# Patient Record
Sex: FEMALE | Race: WHITE | NOT HISPANIC OR LATINO | Employment: FULL TIME | URBAN - NONMETROPOLITAN AREA
[De-identification: names, ages, dates, MRNs, and addresses within clinical notes are randomized per-mention and may not be internally consistent; named-entity substitution may affect disease eponyms.]

---

## 2019-04-02 ENCOUNTER — APPOINTMENT (OUTPATIENT)
Dept: CT IMAGING | Facility: HOSPITAL | Age: 59
End: 2019-04-02

## 2019-04-02 ENCOUNTER — APPOINTMENT (OUTPATIENT)
Dept: GENERAL RADIOLOGY | Facility: HOSPITAL | Age: 59
End: 2019-04-02

## 2019-04-02 ENCOUNTER — HOSPITAL ENCOUNTER (EMERGENCY)
Facility: HOSPITAL | Age: 59
Discharge: ANOTHER HOSPITAL | End: 2019-04-02
Attending: EMERGENCY MEDICINE | Admitting: EMERGENCY MEDICINE

## 2019-04-02 VITALS
OXYGEN SATURATION: 100 % | BODY MASS INDEX: 28.34 KG/M2 | HEART RATE: 93 BPM | DIASTOLIC BLOOD PRESSURE: 92 MMHG | RESPIRATION RATE: 18 BRPM | WEIGHT: 187 LBS | SYSTOLIC BLOOD PRESSURE: 198 MMHG | HEIGHT: 68 IN | TEMPERATURE: 98.1 F

## 2019-04-02 DIAGNOSIS — G45.9 TIA (TRANSIENT ISCHEMIC ATTACK): Primary | ICD-10-CM

## 2019-04-02 DIAGNOSIS — I10 HYPERTENSION, UNSPECIFIED TYPE: ICD-10-CM

## 2019-04-02 LAB
ABO GROUP BLD: NORMAL
ALBUMIN SERPL-MCNC: 4.6 G/DL (ref 3.5–5.2)
ALBUMIN/GLOB SERPL: 1.5 G/DL
ALP SERPL-CCNC: 94 U/L (ref 39–117)
ALT SERPL W P-5'-P-CCNC: 12 U/L (ref 1–33)
ANION GAP SERPL CALCULATED.3IONS-SCNC: 10 MMOL/L
APTT PPP: 27 SECONDS (ref 20–40.3)
AST SERPL-CCNC: 13 U/L (ref 1–32)
BASOPHILS # BLD AUTO: 0.08 10*3/MM3 (ref 0–0.2)
BASOPHILS NFR BLD AUTO: 0.9 % (ref 0–1.5)
BILIRUB SERPL-MCNC: 0.5 MG/DL (ref 0.2–1.2)
BLD GP AB SCN SERPL QL: NEGATIVE
BUN BLD-MCNC: 11 MG/DL (ref 6–20)
BUN/CREAT SERPL: 15.9 (ref 7–25)
CALCIUM SPEC-SCNC: 9.9 MG/DL (ref 8.6–10.5)
CHLORIDE SERPL-SCNC: 104 MMOL/L (ref 98–107)
CO2 SERPL-SCNC: 27 MMOL/L (ref 22–29)
CREAT BLD-MCNC: 0.69 MG/DL (ref 0.57–1)
DEPRECATED RDW RBC AUTO: 40.6 FL (ref 37–54)
EOSINOPHIL # BLD AUTO: 0.32 10*3/MM3 (ref 0–0.4)
EOSINOPHIL NFR BLD AUTO: 3.6 % (ref 0.3–6.2)
ERYTHROCYTE [DISTWIDTH] IN BLOOD BY AUTOMATED COUNT: 12.5 % (ref 12.3–15.4)
GFR SERPL CREATININE-BSD FRML MDRD: 106 ML/MIN/1.73
GFR SERPL CREATININE-BSD FRML MDRD: 87 ML/MIN/1.73
GLOBULIN UR ELPH-MCNC: 3 GM/DL
GLUCOSE BLD-MCNC: 101 MG/DL (ref 65–99)
GLUCOSE BLDC GLUCOMTR-MCNC: 100 MG/DL (ref 70–130)
HCT VFR BLD AUTO: 43.5 % (ref 34–46.6)
HGB BLD-MCNC: 14.9 G/DL (ref 12–15.9)
HOLD SPECIMEN: NORMAL
HOLD SPECIMEN: NORMAL
IMM GRANULOCYTES # BLD AUTO: 0.03 10*3/MM3 (ref 0–0.05)
IMM GRANULOCYTES NFR BLD AUTO: 0.3 % (ref 0–0.5)
INR PPP: 0.83 (ref 0.8–1.2)
LYMPHOCYTES # BLD AUTO: 2.3 10*3/MM3 (ref 0.7–3.1)
LYMPHOCYTES NFR BLD AUTO: 25.7 % (ref 19.6–45.3)
Lab: NORMAL
MCH RBC QN AUTO: 30.1 PG (ref 26.6–33)
MCHC RBC AUTO-ENTMCNC: 34.3 G/DL (ref 31.5–35.7)
MCV RBC AUTO: 87.9 FL (ref 79–97)
MONOCYTES # BLD AUTO: 0.51 10*3/MM3 (ref 0.1–0.9)
MONOCYTES NFR BLD AUTO: 5.7 % (ref 5–12)
NEUTROPHILS # BLD AUTO: 5.71 10*3/MM3 (ref 1.4–7)
NEUTROPHILS NFR BLD AUTO: 63.8 % (ref 42.7–76)
NRBC BLD AUTO-RTO: 0 /100 WBC (ref 0–0)
PLATELET # BLD AUTO: 342 10*3/MM3 (ref 140–450)
PMV BLD AUTO: 10.2 FL (ref 6–12)
POTASSIUM BLD-SCNC: 3.9 MMOL/L (ref 3.5–5.2)
PROT SERPL-MCNC: 7.6 G/DL (ref 6–8.5)
PROTHROMBIN TIME: 11.3 SECONDS (ref 11.1–15.3)
RBC # BLD AUTO: 4.95 10*6/MM3 (ref 3.77–5.28)
RH BLD: NEGATIVE
SODIUM BLD-SCNC: 141 MMOL/L (ref 136–145)
T&S EXPIRATION DATE: NORMAL
TROPONIN T SERPL-MCNC: <0.01 NG/ML (ref 0–0.03)
WBC NRBC COR # BLD: 8.95 10*3/MM3 (ref 3.4–10.8)
WHOLE BLOOD HOLD SPECIMEN: NORMAL
WHOLE BLOOD HOLD SPECIMEN: NORMAL

## 2019-04-02 PROCEDURE — 84484 ASSAY OF TROPONIN QUANT: CPT | Performed by: EMERGENCY MEDICINE

## 2019-04-02 PROCEDURE — 70450 CT HEAD/BRAIN W/O DYE: CPT

## 2019-04-02 PROCEDURE — 85025 COMPLETE CBC W/AUTO DIFF WBC: CPT | Performed by: EMERGENCY MEDICINE

## 2019-04-02 PROCEDURE — 71045 X-RAY EXAM CHEST 1 VIEW: CPT

## 2019-04-02 PROCEDURE — 86900 BLOOD TYPING SEROLOGIC ABO: CPT | Performed by: EMERGENCY MEDICINE

## 2019-04-02 PROCEDURE — 99285 EMERGENCY DEPT VISIT HI MDM: CPT

## 2019-04-02 PROCEDURE — 96374 THER/PROPH/DIAG INJ IV PUSH: CPT

## 2019-04-02 PROCEDURE — 86901 BLOOD TYPING SEROLOGIC RH(D): CPT | Performed by: EMERGENCY MEDICINE

## 2019-04-02 PROCEDURE — 82962 GLUCOSE BLOOD TEST: CPT

## 2019-04-02 PROCEDURE — 85730 THROMBOPLASTIN TIME PARTIAL: CPT | Performed by: EMERGENCY MEDICINE

## 2019-04-02 PROCEDURE — 93005 ELECTROCARDIOGRAM TRACING: CPT | Performed by: EMERGENCY MEDICINE

## 2019-04-02 PROCEDURE — 86850 RBC ANTIBODY SCREEN: CPT | Performed by: EMERGENCY MEDICINE

## 2019-04-02 PROCEDURE — 80053 COMPREHEN METABOLIC PANEL: CPT | Performed by: EMERGENCY MEDICINE

## 2019-04-02 PROCEDURE — 93010 ELECTROCARDIOGRAM REPORT: CPT | Performed by: INTERNAL MEDICINE

## 2019-04-02 PROCEDURE — 85610 PROTHROMBIN TIME: CPT | Performed by: EMERGENCY MEDICINE

## 2019-04-02 PROCEDURE — 25010000002 HYDRALAZINE PER 20 MG: Performed by: EMERGENCY MEDICINE

## 2019-04-02 RX ORDER — SODIUM CHLORIDE 0.9 % (FLUSH) 0.9 %
10 SYRINGE (ML) INJECTION AS NEEDED
Status: DISCONTINUED | OUTPATIENT
Start: 2019-04-02 | End: 2019-04-03 | Stop reason: HOSPADM

## 2019-04-02 RX ORDER — HYDRALAZINE HYDROCHLORIDE 20 MG/ML
10 INJECTION INTRAMUSCULAR; INTRAVENOUS ONCE
Status: COMPLETED | OUTPATIENT
Start: 2019-04-02 | End: 2019-04-02

## 2019-04-02 RX ADMIN — HYDRALAZINE HYDROCHLORIDE 10 MG: 20 INJECTION INTRAMUSCULAR; INTRAVENOUS at 21:29

## 2019-04-03 NOTE — ED PROVIDER NOTES
Subjective   58-year-old white female presents to the emergency department with chief complaint of stroke symptoms.  Patient relates at 7 PM she developed slurred speech, left facial numbness, left facial drooping, abnormal speech and weakness in both upper extremities greater on the left than the right.  The symptoms lasted 2 minutes and resolved.  The same symptoms recurred at 8:45 PM and have resolved again.  Patient relates she now feels back to her baseline.  Patient relates she took 162 mg aspirin prior to arrival.  She denies taking any other blood thinners.            Review of Systems   Constitutional: Negative for chills, diaphoresis and fever.   Eyes: Negative for visual disturbance.   Respiratory: Negative for shortness of breath.    Cardiovascular: Negative for chest pain.   Gastrointestinal: Negative for abdominal pain, nausea and vomiting.   Musculoskeletal: Negative for neck stiffness.   Neurological: Positive for facial asymmetry, speech difficulty, weakness, numbness and headaches. Negative for syncope.   Psychiatric/Behavioral: Negative for confusion.   All other systems reviewed and are negative.      Past Medical History:   Diagnosis Date   • Hypertension        Allergies   Allergen Reactions   • Penicillins Hives       History reviewed. No pertinent surgical history.    History reviewed. No pertinent family history.    Social History     Tobacco Use   • Smoking status: Current Every Day Smoker     Packs/day: 0.50     Years: 40.00     Pack years: 20.00   Substance and Sexual Activity   • Alcohol use: No     Frequency: Never   • Drug use: No           Objective   Physical Exam   Constitutional: She appears well-developed and well-nourished. No distress.   HENT:   Head: Normocephalic and atraumatic.   Right Ear: External ear normal.   Left Ear: External ear normal.   Nose: Nose normal.   Mouth/Throat: Oropharynx is clear and moist.   Eyes: Conjunctivae and EOM are normal. Pupils are equal, round,  and reactive to light.   Neck: Normal range of motion. Neck supple.   Cardiovascular: Normal rate, regular rhythm, normal heart sounds and intact distal pulses.   Pulmonary/Chest: Effort normal and breath sounds normal.   Abdominal: Soft. Bowel sounds are normal. She exhibits no distension. There is no tenderness. There is no guarding.   Musculoskeletal: Normal range of motion. She exhibits no edema or tenderness.   Neurological:   Patient is alert and oriented x3.  Her speech is normal.  Cranial nerves II through XII are intact bilaterally.  Visual fields are intact bilaterally.  She has normal strength and no drift in all extremities.  Sharp dull sensation is intact and symmetrical in all extremities.  Finger to nose and heel to shin movements are done normally bilaterally.   Skin: Skin is warm and dry. She is not diaphoretic.   Psychiatric: She has a normal mood and affect. Her behavior is normal.   Nursing note and vitals reviewed.      ECG 12 Lead    Date/Time: 4/2/2019 9:06 PM  Performed by: Anderson Mcgee MD  Authorized by: Anderson Mcgee MD   Interpreted by physician  Rhythm: sinus rhythm  Rate: normal  BPM: 81  QRS axis: normal  Conduction: conduction normal  ST Segments: ST segments normal  T Waves: T waves normal  Clinical impression: non-specific ECG      Critical Care  Performed by: Anderson Mcgee MD  Authorized by: Anderson Mcgee MD     Critical care provider statement:     Critical care time (minutes):  30    Critical care time was exclusive of:  Separately billable procedures and treating other patients    Critical care was necessary to treat or prevent imminent or life-threatening deterioration of the following conditions:  CNS failure or compromise    Critical care was time spent personally by me on the following activities:  Development of treatment plan with patient or surrogate, discussions with consultants, evaluation of patient's response to treatment, examination of patient, obtaining history  from patient or surrogate, ordering and performing treatments and interventions, ordering and review of laboratory studies, ordering and review of radiographic studies, re-evaluation of patient's condition and review of old charts    I assumed direction of critical care for this patient from another provider in my specialty: no                 ED Course  ED Course as of Apr 02 2148   Tue Apr 02, 2019 2128 Patient is not a candidate for thrombolytic therapy since her symptoms have resolved and her NIHSS is 0.  [facility].  I explained to the patient the need for transfer for higher level of care.  Helicopter has been requested.  [DR]   2146 Dr Cotton accepts transfer to [facility]  [DR]      ED Course User Index  [DR] Anderson Mcgee MD      Labs Reviewed   COMPREHENSIVE METABOLIC PANEL - Abnormal; Notable for the following components:       Result Value    Glucose 101 (*)     All other components within normal limits    Narrative:     GFR Normal >60  Chronic Kidney Disease <60  Kidney Failure <15   PROTIME-INR - Normal    Narrative:     Therapeutic range for most indications is 2.0-3.0 INR,  or 2.5-3.5 for mechanical heart valves.   APTT - Normal    Narrative:     The recommended Heparin therapeutic range is 68-97 seconds.   TROPONIN (IN-HOUSE) - Normal    Narrative:     Troponin T Reference Range:  <= 0.03 ng/mL-   Negative for AMI  >0.03 ng/mL-     Abnormal for myocardial necrosis.  Clinicians would have to utilize clinical acumen, EKG, Troponin and serial changes to determine if it is an Acute Myocardial Infarction or myocardial injury due to an underlying chronic condition.    CBC WITH AUTO DIFFERENTIAL - Normal   POCT GLUCOSE FINGERSTICK - Normal   RAINBOW DRAW    Narrative:     The following orders were created for panel order [facility] Draw.  Procedure                               Abnormality         Status                     ---------                                -----------         ------                     Light Blue Top[[id number]]                                   In process                 Green Top (Gel)[[id number]]                                  In process                 Lavender Top[[id number]]                                     In process                 Gold Top - SST[[id number]]                                   In process                   Please view results for these tests on the individual orders.   POCT GLUCOSE FINGERSTICK   PREVIOUS HISTORY   TYPE AND SCREEN   CBC AND DIFFERENTIAL    Narrative:     The following orders were created for panel order CBC & Differential.  Procedure                               Abnormality         Status                     ---------                               -----------         ------                     CBC Auto Differential[[id number]]        Normal              Final result                 Please view results for these tests on the individual orders.   LIGHT BLUE TOP   GREEN TOP   LAVENDER TOP   GOLD TOP - SST     Xr Chest 1 View    Result Date: 4/2/2019  Narrative: PROCEDURE: XR CHEST 1 VIEW HISTORY: Acute Stroke Protocol (Onset < 12 hrs) COMPARISON: None TECHNIQUE: Single projection of the chest was done. FINDINGS: The lung fields are well inflated . There are no discrete airspace infiltrates, pneumothoraces or pleural effusions. The pulmonary vascularity is normal. The cardiomediastinal silhouette is unremarkable for patient's age and sex.     Impression: There is no acute pleural-parenchymal process seen in the imaged lung fields. Electronically signed by:  Jonny Alonzo MD  4/2/2019 9:16 PM CDT Workstation: [email or web address]    Ct Head Without Contrast Stroke Protocol    Result Date: 4/2/2019  Narrative: PROCEDURE: CT HEAD WO CONTRAST STROKE HISTORY: Stroke Indication: Same as above Comparison: None Technique: CT of the head was done without intravenous contrast was done in the orthogonal planes. This exam was  performed according to our departmental dose-optimization program, which includes automated exposure control, adjustment of the mA and/or KV according to the patient's size and/or use of iterative reconstruction technique. FINDINGS: There is no intracranial hemorrhage, midline shift mass effect or acute focal infarct. There is mild periventricular and deep white matter low attenuation, most likely related to small vessel white matter ischemic disease. There is no hydrocephalus. The mastoid air cells are unremarkable . The paranasal sinuses are unremarkable . There is no visualization of acute fractures involving the calvarium or the skull base.     Impression: There is no acute intracranial abnormality. Mild chronic small vessel white matter ischemic changes seen in the bilateral cerebral hemispheres, predominantly in a periventricular distribution If clinical concern exists regarding an acute ischemic/vascular pathology being responsible for patient's symptomatology, an MRI of the brain is more sensitive than the current study, in ruling out such a possibility. Electronically signed by:  Jonny Alonzo MD  4/2/2019 9:03 PM CDT Workstation: Dr. [email or web address]            NIHSS (NIH Stroke Scale/Score) reviewed and/or performed as part of the patient evaluation and treatment planning process.  The result associated with this review/performance is: 0       MDM      Final diagnoses:   TIA (transient ischemic attack)   Hypertension, unspecified type            Anderson Mcgee MD  04/02/19 5893       Anderson Mcgee MD  04/02/19 4469

## 2019-04-03 NOTE — ED TRIAGE NOTES
"Patient states started at 1900 with \"shakey, slurred speeched\" she states this resolved and felt weak afterwards.   and started again prior to arrival with same symptoms    She complains of facial droopiness, slurred speech, numbness and tingling to left side of face. States weakness on both upper extremities with left sided notes more weakness.   "

## 2019-04-15 ENCOUNTER — TRANSCRIBE ORDERS (OUTPATIENT)
Dept: OCCUPATIONAL THERAPY | Facility: HOSPITAL | Age: 59
End: 2019-04-15

## 2019-04-15 ENCOUNTER — TRANSCRIBE ORDERS (OUTPATIENT)
Dept: PHYSICAL THERAPY | Facility: HOSPITAL | Age: 59
End: 2019-04-15

## 2019-04-15 DIAGNOSIS — I63.9 ACUTE CEREBRAL INFARCTION (HCC): Primary | ICD-10-CM

## 2019-04-15 DIAGNOSIS — I63.9 ACUTE CEREBROVASCULAR ACCIDENT (HCC): Primary | ICD-10-CM

## 2019-04-17 ENCOUNTER — HOSPITAL ENCOUNTER (OUTPATIENT)
Dept: OCCUPATIONAL THERAPY | Facility: HOSPITAL | Age: 59
Setting detail: THERAPIES SERIES
Discharge: HOME | End: 2019-04-17

## 2019-04-17 ENCOUNTER — HOSPITAL ENCOUNTER (OUTPATIENT)
Dept: PHYSICAL THERAPY | Facility: HOSPITAL | Age: 59
Setting detail: THERAPIES SERIES
Discharge: HOME | End: 2019-04-17

## 2019-04-17 DIAGNOSIS — I63.9 ACUTE CEREBROVASCULAR ACCIDENT (HCC): Primary | ICD-10-CM

## 2019-04-17 DIAGNOSIS — I63.9 ACUTE CVA (CEREBROVASCULAR ACCIDENT) (HCC): Primary | ICD-10-CM

## 2019-04-17 PROCEDURE — 97161 PT EVAL LOW COMPLEX 20 MIN: CPT

## 2019-04-17 PROCEDURE — 97165 OT EVAL LOW COMPLEX 30 MIN: CPT

## 2019-04-17 NOTE — THERAPY EVALUATION
"    Outpatient Physical Therapy Ortho Initial Evaluation/Discharge Summary  [facility]     Patient Name: Yesenia Valentin  : 1960  MRN: [id number]  Today's Date: 2019      Visit Date: 2019    There is no problem list on file for this patient.       Past Medical History:   Diagnosis Date   • Hypertension         No past surgical history on file.    Visit Dx:     ICD-10-CM ICD-9-CM   1. Acute cerebrovascular accident (CMS/[facility]) I63.9 434.91         Patient History     Row Name 19 1344 19 1300          History    Chief Complaint  Fatigue/poor endurance;Headache;Muscle weakness;Pain;Other 1 (comment) Increased sensitivity to light  -AB  --     Date Current Problem(s) Began  19  -AB  19  -     Brief Description of Current Complaint  Pt reported her mouth began drawing up on L side of face. She did not go to hospital for 1 hour and 45 min. She went to  ER and was lifeflighted to [facility] and symptoms resolved by 11 PM. Symptoms returned at 3 AM the following morning. Pt reports deficits with speech, R hand and RUE, she has difficulty writing and typing, she reports ROM has resolved. Pt states she is a  and needs to be able to type for her job. Pt reports she returned to work last Monday and although her symptoms have improved, she continues to have difficulties performing at her job.  -AB  Was at work, felt \"funny\" mouth started drawing up. Went to ER was transferred to [facility]. Symptoms resolved after transfer by 11 PM. Symptoms did return early next morning. Patient reports deficits with R UE and speech, especially use of her R hand for typing and writing.      -     Previous treatment for THIS PROBLEM  Other (comment) OT at hospital  -AB  --     Patient/Caregiver Goals  Return to work;Relieve pain;Return to prior level of function Return to work: improve typing skills  -AB  Return to prior level of function  -     Current Tobacco Use  No  -AB "  --     Smoking Status   No  -AB  Quit smoking 2 weeks ago  -     Patient's Rating of General Health  Good  -AB  --     Hand Dominance  right-handed  -AB  right-handed  -     Occupation/sports/leisure activities  , fishing, boating, reading  -AB    -     Patient seeing anyone else for problem(s)?  Referred for speech  -AB  --     What clinical tests have you had for this problem?  --  CT scan;MRI  -     Results of Clinical Tests  --  Probable TIA  -     Related/Recent Hospitalizations  --  Yes  -     Date of Hospitalization  --  04/02/19  -        Pain     Pain Location  Head  -AB  -- Tension in R side of neck  -     Pain at Present  4 Headache  -AB  --     Pain at Best  0  -AB  --     Pain at Worst  7  -AB  --     Pain Frequency  Other (Comment);Constant/continuous Pt reports headache medicine helps symptoms  -AB  --     Pain Description  Aching;Discomfort;Pressure;Tightness  -AB  --     What Performance Factors Make the Current Problem(s) WORSE?  Stress  -AB  --     What Performance Factors Make the Current Problem(s) BETTER?  Headache medication: Tyelonol   -AB  --     Pain Comments  Pain noted in posterior head towards R side.  -AB  --     Is your sleep disturbed?  Yes  -AB  --     Total hours of sleep per night  4 Pt awakens and is up for an hour before going back to sleep  -AB  --     Difficulties at work?  Yes: typing, writing, using a calculator  -AB  --     Difficulties with ADL's?  No  -AB  --     Difficulties with recreational activities?  No  -AB  --        Safety    Are you being hurt, hit, or frightened by anyone at home or in your life?  No  -AB  --     Are you being neglected by a caregiver  No  -AB  --       User Key  (r) = Recorded By, (t) = Taken By, (c) = Cosigned By    Initials Name Provider Type    AB Pawel Francisco, OT Occupational Therapist    DH Radha Leone, PT Physical Therapist          PT Ortho     Row Name 04/17/19 1300       Subjective  Comments    Subjective Comments  Patient reports she does not feel her leg is a problem  -       Posture/Observations    Posture- WNL  Posture is WNL  -       General ROM    GENERAL ROM COMMENTS  WNL  -       MMT (Manual Muscle Testing)    General MMT Comments  R LE MMT grossly 4+/5  -       Balance Skills Training    Standing-Balance Activities  Single Limb Stance Able to maintain SLS for > 30 seconds without LOB.  -       Gait/Stairs Assessment/Training    Comment (Gait/Stairs)  No deficits noted.   -      User Key  (r) = Recorded By, (t) = Taken By, (c) = Cosigned By    Initials Name Provider Type     Radha Leone, PT Physical Therapist           Hand Therapy (last 24 hours)      Hand Eval     Row Name 04/17/19 1400             Hand  Strength     Strength Affected Side  Bilateral  -AB          Strength Right    # Reps  3  -AB      Right Rung  2  -AB      Right  Test 1  62  -AB      Right  Test 2  63  -AB      Right  Test 3  61  -AB       Strength Average Right  62  -AB          Strength Left    # Reps  3  -AB      Left Rung  2  -AB      Left  Test 1  68  -AB      Left  Test 2  74  -AB      Left  Test 3  63  -AB       Strength Average Left  68.33  -AB        User Key  (r) = Recorded By, (t) = Taken By, (c) = Cosigned By    Initials Name Provider Type    AB Pawel Francisco, OT Occupational Therapist                              PT Assessment/Plan     Row Name 04/17/19 1300          PT Assessment    Assessment Comments  Patient does not appear to have strength, mobility or balance deficits of RLE. Physical therapy not indicated at this time. Patient advised to contact her physician or this therapist if her condition should worsen. Discharge from physical therapy.   -        PT Plan    Planned CPT's?  PT ROBERT LOW COMPLEXITY: 55295  -     PT Plan Comments  Plan to discharge. Patient advised to contact her physician or this therapist if her symptoms  or condition should worsen.  -       User Key  (r) = Recorded By, (t) = Taken By, (c) = Cosigned By    Initials Name Provider Type    Radha Dejesus PT Physical Therapist            Exercises     Row Name 04/17/19 1300             Subjective Comments    Subjective Comments  Patient reports she does not feel her leg is a problem  -        User Key  (r) = Recorded By, (t) = Taken By, (c) = Cosigned By    Initials Name Provider Type    Radha Dejesus, PT Physical Therapist                                  Time Calculation:     Start Time: 1300  Stop Time: 1325  Time Calculation (min): 25 min     Therapy Charges for Today     Code Description Service Date Service Provider Modifiers Qty    [id number] HC PT EVAL LOW COMPLEXITY 2 4/17/2019 Radha Leone, PT GP 2                    Radha Leone, RADHA  4/17/2019

## 2019-04-17 NOTE — THERAPY DISCHARGE NOTE
"Outpatient Occupational Therapy Rehab Program Initial Evaluation/Discharge  [facility]     Patient Name: Yesenia Valentin  : 1960  MRN: [id number]  Today's Date: 2019      Visit Date: 2019   Visit Number: 1  Recert Date: N/A  % Improvement: N/A  MD visit date: N/A        There is no problem list on file for this patient.       Past Medical History:   Diagnosis Date   • Hypertension         No past surgical history on file.      Visit Dx:    ICD-10-CM ICD-9-CM   1. Acute CVA (cerebrovascular accident) (CMS/[facility]) I63.9 434.91       Patient History     Row Name 19 1344 19 1300          History    Chief Complaint  Fatigue/poor endurance;Headache;Muscle weakness;Pain;Other 1 (comment) Increased sensitivity to light  -AB  --     Date Current Problem(s) Began  19  -AB  19  -     Brief Description of Current Complaint  Pt reported her mouth began drawing up on L side of face. She did not go to hospital for 1 hour and 45 min. She went to  ER and was lifeflighted to [facility] and symptoms resolved by 11 PM. Symptoms returned at 3 AM the following morning. Pt reports deficits with speech, R hand and RUE, she has difficulty writing and typing, she reports ROM has resolved. Pt states she is a  and needs to be able to type for her job. Pt reports she returned to work last Monday and although her symptoms have improved, she continues to have difficulties performing at her job.  -AB  Was at work, felt \"funny\" mouth started drawing up. Went to ER was transferred to [facility]. Symptoms resolved after transfer by 11 PM. Symptoms did return early next morning. Patient reports deficits with R UE and speech, especially use of her R hand for typing and writing.    DH  -     Previous treatment for THIS PROBLEM  Other (comment) OT at hospital  -AB  --     Patient/Caregiver Goals  Return to work;Relieve pain;Return to prior level of function Return to work: improve typing " skills  -AB  Return to prior level of function  -     Current Tobacco Use  No  -AB  --     Smoking Status   No  -AB  Quit smoking 2 weeks ago  -     Patient's Rating of General Health  Good  -AB  --     Hand Dominance  right-handed  -AB  right-handed  -     Occupation/sports/leisure activities  , fishing, boating, reading  -AB    -     Patient seeing anyone else for problem(s)?  Referred for speech  -AB  --     What clinical tests have you had for this problem?  --  CT scan;MRI  -     Results of Clinical Tests  --  Probable TIA  -     Related/Recent Hospitalizations  --  Yes  -     Date of Hospitalization  --  04/02/19  -        Pain     Pain Location  Head  -AB  -- Tension in R side of neck  -     Pain at Present  4 Headache  -AB  --     Pain at Best  0  -AB  --     Pain at Worst  7  -AB  --     Pain Frequency  Other (Comment);Constant/continuous Pt reports headache medicine helps symptoms  -AB  --     Pain Description  Aching;Discomfort;Pressure;Tightness  -AB  --     What Performance Factors Make the Current Problem(s) WORSE?  Stress  -AB  --     What Performance Factors Make the Current Problem(s) BETTER?  Headache medication: Tyelonol   -AB  --     Pain Comments  Pain noted in posterior head towards R side.  -AB  --     Is your sleep disturbed?  Yes  -AB  --     Total hours of sleep per night  4 Pt awakens and is up for an hour before going back to sleep  -AB  --     Difficulties at work?  Yes: typing, writing, using a calculator  -AB  --     Difficulties with ADL's?  No  -AB  --     Difficulties with recreational activities?  No  -AB  --        Safety    Are you being hurt, hit, or frightened by anyone at home or in your life?  No  -AB  --     Are you being neglected by a caregiver  No  -AB  --       User Key  (r) = Recorded By, (t) = Taken By, (c) = Cosigned By    Initials Name Provider Type    AB Pawel Francisco, OT Occupational Therapist     Radha Leone,  PT Physical Therapist            OT Neuro     Row Name 04/17/19 1400             Home Living    Living Arrangements  house  -AB      Living Environment Comment  Pt reports no difficulties accessing anything in her home. She lives in a house alone.  -AB         Vision- Basic    Current Vision  Other (Comment) Wears glasses post TIA d/t R eye  -AB         Cognitive Assessment/Intervention    Current Cognitive/Communication Assessment  functional  -AB      Orientation Status (Cognition)  situation;time;place;person;oriented x 4  -AB         Sensation    Sensation WNL?  WFL  -AB      Additional Comments  Pt reports no deficits with sensation. OT lightly touched B fingtertips and pt required increased processing time to identify where touch was located in R hand.  -AB         Coordination    Diadochokinesis  Bilteral:;No  -AB      Coordination Tests  Finger to nose eyes closed;9-Hole Peg  -AB      Finger to Nose Eyes Closed  Bilteral:;Intact  -AB      9-Hole Peg Left  20.77 sec  -AB      9-Hole Peg Right  29.5 sec  -AB         General ROM    GENERAL ROM COMMENTS  BUE WNL  -AB         MMT (Manual Muscle Testing)    Rt Upper Ext  Rt Shoulder Flexion;Rt Shoulder Extension;Rt Shoulder Internal Rotation;Rt Shoulder External Rotation;Rt Elbow Flexion;Rt Elbow Extension;Rt Forearm Supination;Rt Forearm Pronation;Rt Wrist Flexion;Rt Wrist Extension  -AB      Lt Upper Ext  Lt Shoulder Flexion;Lt Shoulder Extension;Lt Shoulder Internal Rotation;Lt Shoulder External Rotation;Lt Elbow Flexion;Lt Elbow Extension;Lt Forearm Supination;Lt Forearm Pronation;Lt Wrist Flexion;Lt Wrist Extension  -AB         MMT Right Upper Ext    Rt Shoulder Flexion MMT, Gross Movement  (5/5) normal  -AB      Rt Shoulder Extension MMT, Gross Movement  (5/5) normal  -AB      Rt Shoulder Internal Rotation MMT, Gross Movement  (5/5) normal  -AB      Rt Shoulder External Rotation MMT, Gross Movement  (5/5) normal  -AB      Rt Elbow Flexion MMT, Gross  Movement:  (5/5) normal  -AB      Rt Elbow Extension MMT, Gross Movement:  (5/5) normal  -AB      Rt Forearm Supination MMT, Gross Movement  (5/5) normal  -AB      Rt Forearm Pronation MMT, Gross Movement  (5/5) normal  -AB      Rt Wrist Flexion MMT, Gross Movement  (5/5) normal  -AB      Rt Wrist Extension MMT, Gross Movement  (5/5) normal  -AB         MMT Left Upper Ext    Lt Shoulder Flexion MMT, Gross Movement  (5/5) normal  -AB      Lt Shoulder Extension MMT, Gross Movement  (5/5) normal  -AB      Lt Shoulder Internal Rotation MMT, Gross Movement  (5/5) normal  -AB      Lt Shoulder External Rotation MMT, Gross Movement  (5/5) normal  -AB      Lt Elbow Flexion MMT, Gross Movement  (5/5) normal  -AB      Lt Elbow Extension MMT, Gross Movement  (5/5) normal  -AB      Lt Forearm Supination MMT, Gross Movement  (5/5) normal  -AB      Lt Forearm Pronation MMT, Gross Movement  (5/5) normal  -AB      Lt Wrist Flexion MMT, Gross Movement  (5/5) normal  -AB      Lt Wrist Extension MMT, Gross Movement  (5/5) normal  -AB         Bed Mobility    Bed Mobility, Comment  Independent  -AB         Transfers    Transfer, Comment  Independent  -AB         Functional Mobility    Functional Mobility- Comment  Ambulates independently  -AB         ADL Assessment/Intervention    ADL's Assessed?  Upper Body Bathing;Lower Body Bathing;Upper Body Dressing;Lower Body Dressing;Toileting;Grooming  -AB         Bathing Assessment/Intervention    Comment (Bathing)  Independent  -AB         Upper Body Dressing Assessment/Training    Comment (Upper Body Dressing)  Independent  -AB         Lower Body Dressing Assessment/Training    Comment (Lower Body Dressing)  Independent  -AB         Toileting Assessment/Training    Comment (Toileting)  Independent  -AB         Grooming Assessment/Training    Comment (Grooming)  Independent  -AB        User Key  (r) = Recorded By, (t) = Taken By, (c) = Cosigned By    Initials Name Provider Type    AB Maryam  Pawel, OT Occupational Therapist           Hand Therapy (last 24 hours)      Hand Eval     Row Name 04/17/19 1400             Hand  Strength     Strength Affected Side  Bilateral  -AB          Strength Right    # Reps  3  -AB      Right Rung  2  -AB      Right  Test 1  62  -AB      Right  Test 2  63  -AB      Right  Test 3  61  -AB       Strength Average Right  62  -AB          Strength Left    # Reps  3  -AB      Left Rung  2  -AB      Left  Test 1  68  -AB      Left  Test 2  74  -AB      Left  Test 3  63  -AB       Strength Average Left  68.33  -AB        User Key  (r) = Recorded By, (t) = Taken By, (c) = Cosigned By    Initials Name Provider Type    AB Pawel Francisco, OT Occupational Therapist                Therapy Education     Row Name 04/17/19 1400             Therapy Education    Education Details  HEP: pt was provided with medium soft theraputty and instructed on FMC/pinch and  strengthening exercises with putty and clothespin.  -AB      Given  HEP  -AB      Program  New  -AB      How Provided  Verbal;Demonstration  -AB      Provided to  Patient  -AB      Level of Understanding  Teach back education performed;Verbalized;Demonstrated  -AB        User Key  (r) = Recorded By, (t) = Taken By, (c) = Cosigned By    Initials Name Provider Type    AB Pawel Francisco, OT Occupational Therapist              OT Assessment/Plan     Row Name 04/17/19 1400          OT Assessment    Functional Limitations  Performance in work activities  -AB     Impairments  Dexterity;Pain;Other (comment) Impaired motor processing time  -AB     Assessment Comments  OT evaluation completed this date. Pt is a 57 y/o female presenting this date with adverse reactions associated with a TIA, impacting R side. Pt c/o headaches in R posterior side of head. She presents with decreased  strength and FMC in R hand when compared to L hand. Increased motor processing time also noted this  date.  AROM and BUE strength WNL. Pt reports diffculties completing work tasks such as typing and writing. She was educated on exercises to complete with theraputty and clothespins to improve isolated finger dexterity, as well as  and pinch strengh in R hand. No deficits reported when completing ADLs. D/t high functioning level, pt not recommended for skilled OT services at this time. Pt would possibly benefit from speech therapy.  -AB     OT Diagnosis  Impaired mobility and ADLs  -AB     OT Rehab Potential  Other (comment) Pt is appropriate for speech at this time.  -AB     Patient/caregiver participated in establishment of treatment plan and goals  Yes  -AB     Patient would benefit from skilled therapy intervention  No Pt would possibly benefit from speech at this time.  -AB        OT Plan    OT Frequency  One time visit  -AB     Planned CPT's?  OT EVAL LOW COMPLEXITY: 12939  -AB     OT Plan Comments  OT evaluation only this date. Provided pt with medium soft theraputty at this time. Educated pt to reach out to OT if there are any questions or concerns or functional decline noted.  -AB       User Key  (r) = Recorded By, (t) = Taken By, (c) = Cosigned By    Initials Name Provider Type    AB Pawel Francisco, OT Occupational Therapist                  Outcome Measures     Row Name 04/17/19 1400             Quick DASH    Open a tight or new jar.  2  -AB      Do heavy household chores (e.g., wash walls, wash floors)  1  -AB      Carry a shopping bag or briefcase  1  -AB      Wash your back  1  -AB      Use a knife to cut food  2  -AB      Recreational activities in which you take some force or impact through your arm, should or hand (e.g. golf, hammering, tennis, etc.)  1  -AB      During the past week, to what extent has your arm, shoulder, or hand problem interfered with your normal social activites with family, friends, neighbors or groups?  1  -AB      During the past week, were you limited in your work or  other regular daily activities as a result of your arm, shoulder or hand problem?  4  -AB      Arm, Shoulder, or hand pain  2  -AB      Tingling (pins and needles) in your arm, shoulder, or hand  1  -AB      During the past week, how much difficulty have you had sleeping because of the pain in your arm, shoulder or hand?  1  -AB      Number of Questions Answered  11  -AB      Quick DASH Score  13.64  -AB         Functional Assessment    Outcome Measure Options  Quick DASH  -AB        User Key  (r) = Recorded By, (t) = Taken By, (c) = Cosigned By    Initials Name Provider Type    Pawel De Dios OT Occupational Therapist            Time Calculation:   OT Start Time: 1344  OT Stop Time: 1430  OT Time Calculation (min): 46 min     Therapy Charges for Today     Code Description Service Date Service Provider Modifiers Qty    18888209921  OT EVAL LOW COMPLEXITY 3 4/17/2019 Pawel Francisco OT GO 1                 OP OT Discharge Summary  Date of Discharge: 04/17/19  Reason for Discharge: other (comment)(Pt independent with ADLs/IADLs at this time)  Discharge Destination: Other (comment)(Home with HEP)  Discharge Instructions: OT evaluation only. Pt provided with theraputty HEP and demonstrated understanding of HEP. Pt educated to contact OT with any questions or if functional decline is noted.        Pawel Francisco OT  4/17/2019

## 2019-04-18 ENCOUNTER — TRANSCRIBE ORDERS (OUTPATIENT)
Dept: SPEECH THERAPY | Facility: HOSPITAL | Age: 59
End: 2019-04-18

## 2019-04-18 DIAGNOSIS — I63.9: Primary | ICD-10-CM

## 2019-04-24 ENCOUNTER — HOSPITAL ENCOUNTER (OUTPATIENT)
Dept: SPEECH THERAPY | Facility: HOSPITAL | Age: 59
Setting detail: THERAPIES SERIES
Discharge: HOME | End: 2019-04-24

## 2019-04-24 DIAGNOSIS — R47.1 DYSARTHRIA: Primary | ICD-10-CM

## 2019-04-24 PROCEDURE — 92522 EVALUATE SPEECH PRODUCTION: CPT | Performed by: SPEECH-LANGUAGE PATHOLOGIST

## 2019-04-24 NOTE — THERAPY EVALUATION
Outpatient Speech Language Pathology   Adult Speech Language Cognitive Initial Evaluation  [facility]     Patient Name: Yesenia Valentin  : 1960  MRN: [id number]  Today's Date: 2019        Visit Date: 2019     Pt was seen this date based on a referral from Dr. Garcia following a possible TIA that was later diagnosed as a CVA.     No pain noted this date 0/10 on pain scale.   Pt has attended  ST evaluation.   No noted improvements.     Informal speech assessment completed this date. Pt presents with right sided weakness of labial and lingual muscles. Mild slurred speech noted. Deficits on /s, sh, ch, z, tr, and s/t blends/. Pt has been completing OMEs at home with note improvement since CVA.     POC was developed and education provided for oral motor exercises program and speech strategies. Pt was in agreement.     Pt is a  and is required to speak for a large portion of the day.     Pt would benefit from skilled ST therapy to improve speech production. 1x a week until discharge.     Demi Rhodes MS CCC-SLP    There is no problem list on file for this patient.       Past Medical History:   Diagnosis Date   • Hypertension         History reviewed. No pertinent surgical history.      Visit Dx:    ICD-10-CM ICD-9-CM   1. Dysarthria R47.1 784.51           SLP [facility] - 19 0800        Communication Assessment/Intervention    Document Type  evaluation   -EA    Subjective Information  no complaints;complains of   -EA    Patient Observations  alert;cooperative;agree to therapy   -EA    Patient/Family Observations  CVA   -EA    Patient Effort  good   -EA    Symptoms Noted During/After Treatment  none   -EA       General Information    Patient Profile Reviewed  yes   -EA    Pertinent History Of Current Problem  No Hx of CVA   -EA    Precautions/Limitations, Vision  WFL with corrective lenses   -EA    Precautions/Limitations, Hearing  WFL   -EA    Prior Level of  Function-Communication  WFL   -EA    Plans/Goals Discussed with  patient   -EA    Barriers to Rehab  none identified   -EA    Patient's Goals for Discharge  return to all previous roles/activities speech     speech   -EA       Pain Assessment    Additional Documentation  Pain Scale: Numbers Pre/Post-Treatment (Group)   -EA       Pain Scale: Numbers Pre/Post-Treatment    Pain Scale: Numbers, Pretreatment  0/10 - no pain   -EA    Pain Scale: Numbers, Post-Treatment  0/10 - no pain   -EA       Oral Motor Structure and Function    Oral Motor Structure and Function  mild impairment   -EA    Dentition Assessment  natural, present and adequate   -EA    Mucosal Quality  moist, healthy   -EA       Oral Musculature and Cranial Nerve Assessment    Oral Motor General Assessment  oral labial or buccal impairment;lingual impairment;generalized oral motor weakness   -EA    Mandibular Impairment Detail, Cranial Nerve V (Trigeminal)  reduced strength on right   -EA    Oral Labial or Buccal Impairment, Detail, Cranial Nerve VII (Facial):  right labial droop   -EA    Lingual Impairment, Detail. Cranial Nerves IX, XII (Glossopharyngeal and Hypoglossal)  reduced strength right;CN12: Motor Impairment   -EA    Oral Motor, Comment  right weakness   -EA       Motor Speech Assessment/Intervention    Motor Speech Function  mild impairment   -EA    Characteristics Consistent with Dysarthria  decreased articulation;slurred speech   -EA       SLP Clinical Impressions    SLP Diagnosis  Dysarthria    -EA    Rehab Potential/Prognosis  good   -EA    SLC Criteria for Skilled Therapy Interventions Met  yes   -EA    Functional Impact  functional impact in social situations;functional impact in ADLs   -EA       Communication Treatment Objective and Progress Goals (SLP)    Motor Speech/Dysarthria Treatment Objectives  Motor Speech/Dysarthria Treatment Objectives (Group)   -EA       Motor Speech/Dysarthria Treatment Objectives    Articulation Selection   articulation, SLP goal 1   -EA    Patient Will Implement Strategies Selection  strategy implementation, SLP goal 1   -EA       Articulation Goal 1 (SLP)    Improve Articulation Goal 1 (SLP)  by over-articulating at word level;by over-articulating at phrase level;by over-articulating in connected speech;of specific sounds in connected speech;90%;independently (over 90% accuracy)   -EA    Time Frame (Articulation Goal 1, SLP)  by discharge   -EA    Progress/Outcomes (Articulation Goal 1, SLP)  goal ongoing   -EA       Patient Will Implement Strategies Goal 1 (SLP)    Implement Strategies of Goal 1 (SLP)  using external rate control;90%;independently (over 90% accuracy)   -EA    Time Frame (Strategy Implementation Goal 1, SLP)  by discharge   -EA    Progress/Outcomes (Strategy Implementation Goal 1, SLP)  goal ongoing   -EA      User Key  (r) = Recorded By, (t) = Taken By, (c) = Cosigned By    Initials Name Provider Type    Demi Burgos MS CCC-SLP Speech and Language Pathologist                         OP SLP Education     Row Name 04/24/19 0900       Education    Barriers to Learning  No barriers identified  -EA    Education Provided  Described results of evaluation;Patient expressed understanding of evaluation;Patient participated in establishing goals and treatment plan  -EA    Assessed  Learning needs;Learning motivation  -EA    Learning Motivation  Strong  -EA    Learning Method  Explanation;Demonstration;Written materials  -EA    Teaching Response  Verbalized understanding;Demonstrated understanding  -EA    Education Comments  SLP reviewed eval and POC. SLP provided education on speech strategies and exercises. Pt was in agreement  -EA      User Key  (r) = Recorded By, (t) = Taken By, (c) = Cosigned By    Initials Name Effective Dates    Demi Burgos MS CCC-SLP 10/17/16 -           SLP OP Goals     Row Name 04/24/19 0907          SLP Time Calculation    SLP Goal Re-Cert Due Date  05/24/19   -EA       User Key  (r) = Recorded By, (t) = Taken By, (c) = Cosigned By    Initials Name Provider Type    Demi Burgso MS CCC-SLP Speech and Language Pathologist          OP SLP Assessment/Plan - 04/24/19 0900        SLP Assessment    Functional Problems  Speech Language- Adult/Cognition   -EA    Impact on Function: Adult Speech Language/Cognition  Difficulty communicating wants, needs, and ideas;Difficulty communicating in a medical emergency;Restrictions in personal and social life   -EA    Clinical Impression: Speech Language-Adult/Congnition  Dysarthria- Flaccid   -EA    Functional Problems Comment  Impairments noted in /s, sh, ch, z, tr/ Weakness on right side labial/lingual.    -EA    Clinical Impression Comments  OME program provided education on over-articulation.    -EA    SLP Diagnosis  Dysarthria    -EA    Prognosis  Good (comment)   -EA    Patient/caregiver participated in establishment of treatment plan and goals  Yes   -EA    Patient would benefit from skilled therapy intervention  Yes   -EA       SLP Plan    Frequency  1x a week    -EA    Duration  until discharge    -EA    Planned CPT's?  SLP INDIVIDUAL SPEECH THERAPY: 37390   -EA    Expected Duration Therapy Session - minutes  30-45 minutes   -EA    Plan Comments  Initiate POC   -EA      User Key  (r) = Recorded By, (t) = Taken By, (c) = Cosigned By    Initials Name Provider Type    Demi Burgos MS CCC-SLP Speech and Language Pathologist                 Time Calculation:   SLP Start Time: 0800  SLP Stop Time: 0900  SLP Time Calculation (min): 60 min  Total Timed Code Minutes- SLP: 60 minute(s)    Therapy Charges for Today     Code Description Service Date Service Provider Modifiers Qty    [id number] [facility] SPEECH SOUND PRODUCTION 4 4/24/2019 Demi Rhodes MS CCC-SLP GN 1                   Demi Rhodes MS CCC-SLP  4/24/2019

## 2019-05-01 ENCOUNTER — HOSPITAL ENCOUNTER (OUTPATIENT)
Dept: SPEECH THERAPY | Facility: HOSPITAL | Age: 59
Setting detail: THERAPIES SERIES
Discharge: HOME | End: 2019-05-01

## 2019-05-01 DIAGNOSIS — R47.1 DYSARTHRIA: Primary | ICD-10-CM

## 2019-05-01 PROCEDURE — 92507 TX SP LANG VOICE COMM INDIV: CPT | Performed by: SPEECH-LANGUAGE PATHOLOGIST

## 2019-05-01 NOTE — THERAPY TREATMENT NOTE
Outpatient Speech Language Pathology   Adult Speech Language Cognitive Treatment Note  [facility]     Patient Name: Yesenia Valentin  : 1960  MRN: [id number]  Today's Date: 2019         Visit Date: 2019   There is no problem list on file for this patient.    Pt is being based on a referral from Dr. Garcia following a possible TIA that was later diagnosed as an acute CVA.      No pain noted this date 0/10 on pain scale.   Pt has attended 2/2  Including ST evaluation.     Pt verbalized increased movement of right corner of mouth during pucker exercises. Pt is compliant with exercises and has been completing them daily. Education and models provided on correct use and using resistance. Pt was in agreement. Over-articulation and breath support educated this date and home copies provided.     Speech strategies of slow rate, over-articulation, and breath support were completed on single words and phrases this date. The speech  provided visual feedback on correct lip and tongue placement. Pt required models, visual cues, and repetition. Pt is making progress towards goals.      Pt is a  and is required to speak for a large portion of the day.      Pt would benefit from skilled ST therapy to improve speech production. 1x a week until discharge.      Demi Rhodes, MS CCC-SLP    Assessment 2019: Pt presents with right sided weakness of labial and lingual muscles. Mild slurred speech noted. Deficits on /s, sh, ch, z, tr, and s/t blends/. Pt has been completing OMEs at home with note improvement since CVA.     Visit Dx:    ICD-10-CM ICD-9-CM   1. Dysarthria R47.1 784.51       SLP SLC Evaluation - 19 0805        Communication Assessment/Intervention    Document Type  therapy note (daily note)   -EA    Subjective Information  no complaints   -EA    Patient Observations  alert;cooperative;agree to therapy   -EA    Patient/Family Observations  CVA   -EA    Patient Effort  good    -EA    Symptoms Noted During/After Treatment  none   -EA       General Information    Patient Profile Reviewed  yes   -EA    Precautions/Limitations, Vision  WFL with corrective lenses   -EA    Precautions/Limitations, Hearing  WFL   -EA    Prior Level of Function-Communication  WFL   -EA    Plans/Goals Discussed with  patient   -EA    Barriers to Rehab  none identified   -EA    Patient's Goals for Discharge  return to all previous roles/activities speech     speech   -EA       Pain Scale: Numbers Pre/Post-Treatment    Pain Scale: Numbers, Pretreatment  0/10 - no pain   -EA    Pain Scale: Numbers, Post-Treatment  0/10 - no pain   -EA       Oral Motor Structure and Function    Oral Motor Structure and Function  mild impairment   -EA    Dentition Assessment  natural, present and adequate   -EA    Mucosal Quality  moist, healthy   -EA       Oral Musculature and Cranial Nerve Assessment    Oral Motor General Assessment  oral labial or buccal impairment;lingual impairment;generalized oral motor weakness   -EA    Mandibular Impairment Detail, Cranial Nerve V (Trigeminal)  reduced strength on right   -EA    Oral Labial or Buccal Impairment, Detail, Cranial Nerve VII (Facial):  right labial droop   -EA    Lingual Impairment, Detail. Cranial Nerves IX, XII (Glossopharyngeal and Hypoglossal)  reduced strength right;CN12: Motor Impairment   -EA       Motor Speech Assessment/Intervention    Motor Speech Function  mild impairment   -EA    Characteristics Consistent with Dysarthria  decreased articulation;slurred speech   -EA       SLP Clinical Impressions    Rehab Potential/Prognosis  good   -EA    SLC Criteria for Skilled Therapy Interventions Met  yes   -EA    Functional Impact  functional impact in social situations;functional impact in ADLs   -EA       Communication Treatment Objective and Progress Goals (SLP)    Motor Speech/Dysarthria Treatment Objectives  Motor Speech/Dysarthria Treatment Objectives (Group)   -EA        Motor Speech/Dysarthria Treatment Objectives    Articulation Selection  articulation, SLP goal 1   -EA    Patient Will Implement Strategies Selection  strategy implementation, SLP goal 1   -EA       Articulation Goal 1 (SLP)    Improve Articulation Goal 1 (SLP)  by over-articulating at word level;by over-articulating at phrase level;by over-articulating in connected speech;of specific sounds in connected speech;90%;independently (over 90% accuracy)   -EA    Time Frame (Articulation Goal 1, SLP)  by discharge   -EA    Progress/Outcomes (Articulation Goal 1, SLP)  goal ongoing   -EA    Comment (Articulation Goal 1, SLP)  Over-articulation implemented this date on single words and phrases. Models for use provided by SLP. Pt stated that she felt fatigued  following speech. OME completed with tongue and lips.    -EA       Patient Will Implement Strategies Goal 1 (SLP)    Implement Strategies of Goal 1 (SLP)  using external rate control;90%;independently (over 90% accuracy)   -EA    Time Frame (Strategy Implementation Goal 1, SLP)  by discharge   -EA    Progress/Outcomes (Strategy Implementation Goal 1, SLP)  goal ongoing   -EA    Comment (Strategy Implementation Goal 1, SLP)  Rate control education completed this date for slow rate wth precision for speech. Pt was in agreement. Home exercises provided.    -EA      User Key  (r) = Recorded By, (t) = Taken By, (c) = Cosigned By    Initials Name Provider Type    Demi Burgos MS CCC-SLP Speech and Language Pathologist                        SLP OP Goals     Row Name 05/01/19 0905          SLP Time Calculation    SLP Goal Re-Cert Due Date  05/24/19  -EA       User Key  (r) = Recorded By, (t) = Taken By, (c) = Cosigned By    Initials Name Provider Type    Demi Burgos MS CCC-SLP Speech and Language Pathologist          OP SLP Education     Row Name 05/01/19 0900       Education    Barriers to Learning  No barriers identified  -EA    Education Provided   Patient demonstrated recommended strategies;Patient requires further education on strategies, risks  -EA    Assessed  Learning motivation;Learning preferences  -EA    Learning Motivation  Strong  -EA    Learning Method  Explanation;Demonstration;Written materials  -EA    Teaching Response  Verbalized understanding;Demonstrated understanding  -EA    Education Comments  SLP reviewed POC and provided over-articulation, rate control, and OME program for home use. Pt was in agreement.   -EA      User Key  (r) = Recorded By, (t) = Taken By, (c) = Cosigned By    Initials Name Effective Dates    Demi Burgos MS CCC-SLP 10/17/16 -           OP SLP Assessment/Plan - 05/01/19 0900        SLP Assessment    Functional Problems  Speech Language- Adult/Cognition   -EA    Impact on Function: Adult Speech Language/Cognition  Restrictions in personal and social life   -EA    Clinical Impression: Speech Language-Adult/Congnition  Dysarthria- Flaccid   -EA    Functional Problems Comment  Over-articulation and rate control completed this date.    -EA    Clinical Impression Comments  OME completed and reviewed by SLP. Pt was in agreement.    -EA    SLP Diagnosis  Dysarthria    -EA    Prognosis  Good (comment)   -EA    Patient/caregiver participated in establishment of treatment plan and goals  Yes   -EA    Patient would benefit from skilled therapy intervention  Yes   -EA       SLP Plan    Frequency  1x a week    -EA    Duration  until discharge    -EA    Planned CPT's?  SLP INDIVIDUAL SPEECH THERAPY: 11108   -EA    Expected Duration Therapy Session - minutes  30-45 minutes   -EA    Plan Comments  Continue    -EA      User Key  (r) = Recorded By, (t) = Taken By, (c) = Cosigned By    Initials Name Provider Type    Demi Burgos MS CCC-SLP Speech and Language Pathologist                 Time Calculation:   SLP Start Time: 0805  SLP Stop Time: 0900  SLP Time Calculation (min): 55 min  Total Timed Code Minutes- SLP: 55  minute(s)    Therapy Charges for Today     Code Description Service Date Service Provider Modifiers Qty    [id number]  ST TREATMENT SPEECH 4 5/1/2019 Demi Rhodes, MS CCC-SLP GN 1                   Demi Rhodes MS CCC-SLP  5/1/2019

## 2019-05-07 ENCOUNTER — HOSPITAL ENCOUNTER (OUTPATIENT)
Dept: SPEECH THERAPY | Facility: HOSPITAL | Age: 59
Setting detail: THERAPIES SERIES
Discharge: HOME | End: 2019-05-07

## 2019-05-07 DIAGNOSIS — R47.1 DYSARTHRIA: Primary | ICD-10-CM

## 2019-05-07 PROCEDURE — 92507 TX SP LANG VOICE COMM INDIV: CPT | Performed by: SPEECH-LANGUAGE PATHOLOGIST

## 2019-05-07 NOTE — THERAPY TREATMENT NOTE
"Outpatient Speech Language Pathology   Adult Speech Language Cognitive Treatment Note  [facility]     Patient Name: Yesenia Valentin  : 1960  MRN: [id number]  Today's Date: 2019         Visit Date: 2019   There is no problem list on file for this patient.    Pt is being based on a referral from Dr. Garcia following a possible TIA that was later diagnosed as an acute CVA.      No pain noted this date 0/10 on pain scale.   Pt has attended 3/3  Including ST evaluation.      Pt continued to verbalize increased movement of right corner of mouth during pucker exercises as well as incremental progress. Pt is compliant with exercises and has been completing them daily. Education and models provided on correct use and using resistance. Pt was in agreement. Over-articulation and breath support educated this date and home copies provided.      Speech strategies of slow rate, over-articulation, and breath support were completed on single words and phrases this date as well as placement for /s/ with tongue midline. The speech  provided visual feedback on correct lip and tongue placement. Pt required models, visual cues, and repetition. Pt is making progress towards goals.     Pt completed paragraph reading this date with auditory feedback. After listening to reading pt stated that \"I sound better than I thought\". SLP reviewed continued practice of speech strategies and OME. Pt was in agreement. Pt will be seen 2x a month.      Pt is a  and is required to speak for a large portion of the day.      Pt would benefit from skilled ST therapy to improve speech production. 1x a week until discharge.      Demi Rhodes MS CCC-SLP     Assessment 2019: Pt presents with right sided weakness of labial and lingual muscles. Mild slurred speech noted. Deficits on /s, sh, ch, z, tr, and s/t blends/. Pt has been completing OMEs at home with note improvement since CVA.            Visit Dx:    " ICD-10-CM ICD-9-CM   1. Dysarthria R47.1 784.51       SLP SLC Evaluation - 05/07/19 0805        Communication Assessment/Intervention    Document Type  therapy note (daily note)   -EA    Subjective Information  no complaints   -EA    Patient Observations  alert;cooperative;agree to therapy   -EA    Patient/Family Observations  CVA with residual dyarthria and right hand weakness and decreased coordination    -EA    Patient Effort  good   -EA    Symptoms Noted During/After Treatment  none   -EA       General Information    Patient Profile Reviewed  yes   -EA    Precautions/Limitations, Vision  WFL with corrective lenses   -EA    Precautions/Limitations, Hearing  WFL   -EA    Prior Level of Function-Communication  WFL   -EA    Plans/Goals Discussed with  patient   -EA    Barriers to Rehab  none identified   -EA    Patient's Goals for Discharge  return to all previous roles/activities speech     speech   -EA       Pain Scale: Numbers Pre/Post-Treatment    Pain Scale: Numbers, Pretreatment  0/10 - no pain   -EA    Pain Scale: Numbers, Post-Treatment  0/10 - no pain   -EA       Oral Motor Structure and Function    Oral Motor Structure and Function  mild impairment   -EA    Dentition Assessment  natural, present and adequate   -EA    Mucosal Quality  moist, healthy   -EA       Oral Musculature and Cranial Nerve Assessment    Oral Motor General Assessment  oral labial or buccal impairment;lingual impairment;generalized oral motor weakness   -EA    Mandibular Impairment Detail, Cranial Nerve V (Trigeminal)  reduced strength on right   -EA    Oral Labial or Buccal Impairment, Detail, Cranial Nerve VII (Facial):  right labial droop   -EA    Lingual Impairment, Detail. Cranial Nerves IX, XII (Glossopharyngeal and Hypoglossal)  reduced strength right;CN12: Motor Impairment   -EA       Motor Speech Assessment/Intervention    Motor Speech Function  mild impairment   -EA    Characteristics Consistent with Dysarthria  decreased  articulation;slurred speech   -EA       SLP Clinical Impressions    Rehab Potential/Prognosis  good   -EA    SLC Criteria for Skilled Therapy Interventions Met  yes   -EA    Functional Impact  functional impact in social situations;functional impact in ADLs   -EA       Communication Treatment Objective and Progress Goals (SLP)    Motor Speech/Dysarthria Treatment Objectives  Motor Speech/Dysarthria Treatment Objectives (Group)   -EA       Motor Speech/Dysarthria Treatment Objectives    Articulation Selection  articulation, SLP goal 1   -EA    Patient Will Implement Strategies Selection  strategy implementation, SLP goal 1   -EA       Articulation Goal 1 (SLP)    Improve Articulation Goal 1 (SLP)  by over-articulating at word level;by over-articulating at phrase level;by over-articulating in connected speech;of specific sounds in connected speech;90%;independently (over 90% accuracy)   -EA    Time Frame (Articulation Goal 1, SLP)  by discharge   -EA    Progress/Outcomes (Articulation Goal 1, SLP)  goal ongoing   -EA    Comment (Articulation Goal 1, SLP)  Over-articulation, oral motor and facial exercises completed and reviewed this date. Pt stated that she sees small improvements. Phrases and paragraph completion using speech strategies.    -EA       Patient Will Implement Strategies Goal 1 (SLP)    Implement Strategies of Goal 1 (SLP)  using external rate control;90%;independently (over 90% accuracy)   -EA    Time Frame (Strategy Implementation Goal 1, SLP)  by discharge   -EA    Progress/Outcomes (Strategy Implementation Goal 1, SLP)  goal ongoing   -EA    Comment (Strategy Implementation Goal 1, SLP)  Slow rate and increased breath support practiced this date as well as modeled for use in conversational speech.    -EA      User Key  (r) = Recorded By, (t) = Taken By, (c) = Cosigned By    Initials Name Provider Type    Demi Burgos MS CCC-SLP Speech and Language Pathologist                        SLP OP  Goals     Row Name 05/07/19 1029          SLP Time Calculation    SLP Goal Re-Cert Due Date  05/24/19  -EA       User Key  (r) = Recorded By, (t) = Taken By, (c) = Cosigned By    Initials Name Provider Type    Demi Burgos MS CCC-SLP Speech and Language Pathologist          OP SLP Education     Row Name 05/07/19 1000       Education    Barriers to Learning  No barriers identified  -EA    Education Provided  Patient demonstrated recommended strategies;Patient requires further education on strategies, risks  -EA    Assessed  Learning needs;Learning motivation  -EA    Learning Motivation  Strong  -EA    Learning Method  Explanation;Demonstration;Written materials  -EA    Teaching Response  Verbalized understanding;Demonstrated understanding  -EA    Education Comments  SLP reviewed speech strategies and OME for home use. Pt was in agreement and is making progress towards goals.   -EA      User Key  (r) = Recorded By, (t) = Taken By, (c) = Cosigned By    Initials Name Effective Dates    DELBERT Demi Rhodes MS CCC-SLP 10/17/16 -           OP SLP Assessment/Plan - 05/07/19 0800        SLP Assessment    Functional Problems  Speech Language- Adult/Cognition   -EA    Impact on Function: Adult Speech Language/Cognition  Restrictions in personal and social life   -EA    Clinical Impression: Speech Language-Adult/Congnition  Dysarthria- Flaccid   -EA    Functional Problems Comment  Speech strategies reviewed and OME completed.    -EA    Clinical Impression Comments  Pt is compliant and making progress towards goals.    -EA    SLP Diagnosis  Dysarthria    -EA    Prognosis  Good (comment)   -EA    Patient/caregiver participated in establishment of treatment plan and goals  Yes   -EA    Patient would benefit from skilled therapy intervention  Yes   -EA       SLP Plan    Frequency  1x a week    -EA    Duration  until discharge    -EA    Planned CPT's?  SLP INDIVIDUAL SPEECH THERAPY: 97692   -EA    Expected Duration  Therapy Session - minutes  30-45 minutes   -EA    Plan Comments  Continue    -EA      User Key  (r) = Recorded By, (t) = Taken By, (c) = Cosigned By    Initials Name Provider Type    Demi Burgos MS CCC-SLP Speech and Language Pathologist                 Time Calculation:   SLP Start Time: 0805  SLP Stop Time: 0853  SLP Time Calculation (min): 48 min  Total Timed Code Minutes- SLP: 48 minute(s)    Therapy Charges for Today     Code Description Service Date Service Provider Modifiers Qty    [id number]  ST TREATMENT SPEECH 3 5/7/2019 Demi Rhodes MS CCC-SLP GN 1                   Demi Rhodes MS CCC-SLP  5/7/2019

## 2019-05-22 ENCOUNTER — APPOINTMENT (OUTPATIENT)
Dept: SPEECH THERAPY | Facility: HOSPITAL | Age: 59
End: 2019-05-22

## 2020-04-05 ENCOUNTER — HOSPITAL ENCOUNTER (EMERGENCY)
Facility: HOSPITAL | Age: 60
Discharge: HOME | End: 2020-04-05
Attending: EMERGENCY MEDICINE | Admitting: EMERGENCY MEDICINE

## 2020-04-05 ENCOUNTER — APPOINTMENT (OUTPATIENT)
Dept: GENERAL RADIOLOGY | Facility: HOSPITAL | Age: 60
End: 2020-04-05

## 2020-04-05 VITALS
SYSTOLIC BLOOD PRESSURE: 131 MMHG | HEIGHT: 68 IN | DIASTOLIC BLOOD PRESSURE: 78 MMHG | OXYGEN SATURATION: 95 % | RESPIRATION RATE: 24 BRPM | WEIGHT: 172 LBS | TEMPERATURE: 97.4 F | HEART RATE: 102 BPM | BODY MASS INDEX: 26.07 KG/M2

## 2020-04-05 DIAGNOSIS — J45.901 EXACERBATION OF ASTHMA, UNSPECIFIED ASTHMA SEVERITY, UNSPECIFIED WHETHER PERSISTENT: Primary | ICD-10-CM

## 2020-04-05 LAB
ALBUMIN SERPL-MCNC: 4.5 G/DL (ref 3.5–5.2)
ALBUMIN/GLOB SERPL: 1.7 G/DL
ALP SERPL-CCNC: 112 U/L (ref 39–117)
ALT SERPL W P-5'-P-CCNC: 17 U/L (ref 1–33)
ANION GAP SERPL CALCULATED.3IONS-SCNC: 17 MMOL/L (ref 5–15)
AST SERPL-CCNC: 24 U/L (ref 1–32)
BASOPHILS # BLD AUTO: 0.16 10*3/MM3 (ref 0–0.2)
BASOPHILS NFR BLD AUTO: 1.1 % (ref 0–1.5)
BILIRUB SERPL-MCNC: 0.3 MG/DL (ref 0.2–1.2)
BUN BLD-MCNC: 11 MG/DL (ref 8–23)
BUN/CREAT SERPL: 11.6 (ref 7–25)
CALCIUM SPEC-SCNC: 9.6 MG/DL (ref 8.6–10.5)
CHLORIDE SERPL-SCNC: 104 MMOL/L (ref 98–107)
CO2 SERPL-SCNC: 19 MMOL/L (ref 22–29)
CREAT BLD-MCNC: 0.95 MG/DL (ref 0.57–1)
DEPRECATED RDW RBC AUTO: 42.7 FL (ref 37–54)
EOSINOPHIL # BLD AUTO: 1.64 10*3/MM3 (ref 0–0.4)
EOSINOPHIL NFR BLD AUTO: 11.5 % (ref 0.3–6.2)
ERYTHROCYTE [DISTWIDTH] IN BLOOD BY AUTOMATED COUNT: 12.4 % (ref 12.3–15.4)
GFR SERPL CREATININE-BSD FRML MDRD: 60 ML/MIN/1.73
GLOBULIN UR ELPH-MCNC: 2.7 GM/DL
GLUCOSE BLD-MCNC: 193 MG/DL (ref 65–99)
HCT VFR BLD AUTO: 43.6 % (ref 34–46.6)
HGB BLD-MCNC: 14.4 G/DL (ref 12–15.9)
HOLD SPECIMEN: NORMAL
HOLD SPECIMEN: NORMAL
IMM GRANULOCYTES # BLD AUTO: 0.06 10*3/MM3 (ref 0–0.05)
IMM GRANULOCYTES NFR BLD AUTO: 0.4 % (ref 0–0.5)
LYMPHOCYTES # BLD AUTO: 5.83 10*3/MM3 (ref 0.7–3.1)
LYMPHOCYTES NFR BLD AUTO: 40.9 % (ref 19.6–45.3)
MAGNESIUM SERPL-MCNC: 2.2 MG/DL (ref 1.6–2.4)
MCH RBC QN AUTO: 30.9 PG (ref 26.6–33)
MCHC RBC AUTO-ENTMCNC: 33 G/DL (ref 31.5–35.7)
MCV RBC AUTO: 93.6 FL (ref 79–97)
MONOCYTES # BLD AUTO: 0.89 10*3/MM3 (ref 0.1–0.9)
MONOCYTES NFR BLD AUTO: 6.2 % (ref 5–12)
NEUTROPHILS # BLD AUTO: 5.67 10*3/MM3 (ref 1.7–7)
NEUTROPHILS NFR BLD AUTO: 39.9 % (ref 42.7–76)
NRBC BLD AUTO-RTO: 0 /100 WBC (ref 0–0.2)
NT-PROBNP SERPL-MCNC: 98.4 PG/ML (ref 5–900)
PLATELET # BLD AUTO: 441 10*3/MM3 (ref 140–450)
PMV BLD AUTO: 10.1 FL (ref 6–12)
POTASSIUM BLD-SCNC: 3.7 MMOL/L (ref 3.5–5.2)
PROT SERPL-MCNC: 7.2 G/DL (ref 6–8.5)
RBC # BLD AUTO: 4.66 10*6/MM3 (ref 3.77–5.28)
SODIUM BLD-SCNC: 140 MMOL/L (ref 136–145)
WBC NRBC COR # BLD: 14.25 10*3/MM3 (ref 3.4–10.8)
WHOLE BLOOD HOLD SPECIMEN: NORMAL
WHOLE BLOOD HOLD SPECIMEN: NORMAL

## 2020-04-05 PROCEDURE — 83880 ASSAY OF NATRIURETIC PEPTIDE: CPT | Performed by: EMERGENCY MEDICINE

## 2020-04-05 PROCEDURE — 94799 UNLISTED PULMONARY SVC/PX: CPT

## 2020-04-05 PROCEDURE — 85025 COMPLETE CBC W/AUTO DIFF WBC: CPT | Performed by: EMERGENCY MEDICINE

## 2020-04-05 PROCEDURE — 93010 ELECTROCARDIOGRAM REPORT: CPT | Performed by: INTERNAL MEDICINE

## 2020-04-05 PROCEDURE — 71045 X-RAY EXAM CHEST 1 VIEW: CPT

## 2020-04-05 PROCEDURE — 94640 AIRWAY INHALATION TREATMENT: CPT

## 2020-04-05 PROCEDURE — 96374 THER/PROPH/DIAG INJ IV PUSH: CPT

## 2020-04-05 PROCEDURE — 93005 ELECTROCARDIOGRAM TRACING: CPT | Performed by: EMERGENCY MEDICINE

## 2020-04-05 PROCEDURE — 25010000002 DIPHENHYDRAMINE PER 50 MG: Performed by: EMERGENCY MEDICINE

## 2020-04-05 PROCEDURE — 99285 EMERGENCY DEPT VISIT HI MDM: CPT

## 2020-04-05 PROCEDURE — 96375 TX/PRO/DX INJ NEW DRUG ADDON: CPT

## 2020-04-05 PROCEDURE — 25010000002 METHYLPREDNISOLONE PER 125 MG: Performed by: EMERGENCY MEDICINE

## 2020-04-05 PROCEDURE — 83735 ASSAY OF MAGNESIUM: CPT | Performed by: EMERGENCY MEDICINE

## 2020-04-05 PROCEDURE — 80053 COMPREHEN METABOLIC PANEL: CPT | Performed by: EMERGENCY MEDICINE

## 2020-04-05 RX ORDER — IPRATROPIUM BROMIDE AND ALBUTEROL SULFATE 2.5; .5 MG/3ML; MG/3ML
3 SOLUTION RESPIRATORY (INHALATION) ONCE
Status: COMPLETED | OUTPATIENT
Start: 2020-04-05 | End: 2020-04-05

## 2020-04-05 RX ORDER — SODIUM CHLORIDE 0.9 % (FLUSH) 0.9 %
10 SYRINGE (ML) INJECTION AS NEEDED
Status: DISCONTINUED | OUTPATIENT
Start: 2020-04-05 | End: 2020-04-05 | Stop reason: HOSPADM

## 2020-04-05 RX ORDER — PREDNISONE 20 MG/1
20 TABLET ORAL DAILY
Qty: 5 TABLET | Refills: 0 | Status: SHIPPED | OUTPATIENT
Start: 2020-04-06 | End: 2020-04-11

## 2020-04-05 RX ORDER — ALBUTEROL SULFATE 90 UG/1
2 AEROSOL, METERED RESPIRATORY (INHALATION)
COMMUNITY
Start: 2020-03-26 | End: 2021-03-27

## 2020-04-05 RX ORDER — DIPHENHYDRAMINE HYDROCHLORIDE 50 MG/ML
50 INJECTION INTRAMUSCULAR; INTRAVENOUS ONCE
Status: COMPLETED | OUTPATIENT
Start: 2020-04-05 | End: 2020-04-05

## 2020-04-05 RX ORDER — HYDRALAZINE HYDROCHLORIDE 25 MG/1
25 TABLET, FILM COATED ORAL DAILY PRN
COMMUNITY
End: 2022-09-09

## 2020-04-05 RX ORDER — LISINOPRIL 40 MG/1
40 TABLET ORAL DAILY
COMMUNITY
Start: 2019-12-10 | End: 2022-09-09

## 2020-04-05 RX ORDER — ATORVASTATIN CALCIUM 40 MG/1
40 TABLET, FILM COATED ORAL
COMMUNITY
Start: 2019-12-10 | End: 2022-09-09 | Stop reason: SDUPTHER

## 2020-04-05 RX ORDER — AMLODIPINE BESYLATE 10 MG/1
10 TABLET ORAL DAILY
COMMUNITY
Start: 2019-12-10 | End: 2022-09-09 | Stop reason: SDUPTHER

## 2020-04-05 RX ORDER — METHYLPREDNISOLONE SODIUM SUCCINATE 125 MG/2ML
125 INJECTION, POWDER, LYOPHILIZED, FOR SOLUTION INTRAMUSCULAR; INTRAVENOUS ONCE
Status: COMPLETED | OUTPATIENT
Start: 2020-04-05 | End: 2020-04-05

## 2020-04-05 RX ORDER — CLOPIDOGREL BISULFATE 75 MG/1
75 TABLET ORAL DAILY
COMMUNITY
Start: 2019-12-10 | End: 2022-09-09 | Stop reason: SDUPTHER

## 2020-04-05 RX ADMIN — DIPHENHYDRAMINE HYDROCHLORIDE 50 MG: 50 INJECTION, SOLUTION INTRAMUSCULAR; INTRAVENOUS at 03:57

## 2020-04-05 RX ADMIN — IPRATROPIUM BROMIDE AND ALBUTEROL SULFATE 3 ML: 2.5; .5 SOLUTION RESPIRATORY (INHALATION) at 04:01

## 2020-04-05 RX ADMIN — METHYLPREDNISOLONE SODIUM SUCCINATE 125 MG: 125 INJECTION, POWDER, FOR SOLUTION INTRAMUSCULAR; INTRAVENOUS at 03:56

## 2020-04-05 NOTE — ED PROVIDER NOTES
Subjective   59-year-old female presents to the emergency department with complaint of asthma attack.  She reports that over the last few months she has been newly diagnosed with asthma and is having progressively worsening episodes.  She has inhalers at home which helps most days but this evening she woke up suddenly from sleep with shortness of breath and wheezing.  Inhaler did not help much.  She drove herself to the emergency department.  Denies any fever, recent upper respiratory symptoms, or known allergic cause.  Denies swelling.  Denies chest pain.  She does have a dry cough.    Family history, surgical history, social history, current medications and allergies are reviewed with the patient and triage documentation and vitals are reviewed.        History provided by:  Patient and medical records   used: No        Review of Systems   Constitutional: Negative for chills, diaphoresis and fever.   HENT: Negative for congestion, sinus pressure, sinus pain and sore throat.    Eyes: Negative.    Respiratory: Positive for cough, chest tightness, shortness of breath and wheezing.    Cardiovascular: Negative for chest pain, palpitations and leg swelling.   Gastrointestinal: Negative for abdominal pain, constipation, diarrhea, nausea and vomiting.   Endocrine: Negative.    Genitourinary: Negative for frequency and urgency.   Musculoskeletal: Negative for arthralgias, back pain, myalgias and neck pain.   Skin: Negative for color change, pallor, rash and wound.   Allergic/Immunologic: Negative.    Neurological: Negative.    Hematological: Negative.    Psychiatric/Behavioral: Negative.        Past Medical History:   Diagnosis Date   • Asthma    • Hypertension    • Stroke (CMS/[facility])        Allergies   Allergen Reactions   • Penicillins Hives       History reviewed. No pertinent surgical history.    History reviewed. No pertinent family history.    Social History     Socioeconomic History   • Marital  status:      Spouse name: Not on file   • Number of children: Not on file   • Years of education: Not on file   • Highest education level: Not on file   Tobacco Use   • Smoking status: Current Every Day Smoker     Packs/day: 0.50     Years: 40.00     Pack years: 20.00   • Tobacco comment: quit 1 rigo ago   Substance and Sexual Activity   • Alcohol use: No     Frequency: Never   • Drug use: No   • Sexual activity: Defer           Objective   Physical Exam   Constitutional: She is oriented to person, place, and time. She appears well-developed and well-nourished.  Non-toxic appearance. She appears ill. She appears distressed.   HENT:   Head: Normocephalic.   Mouth/Throat: Oropharynx is clear and moist.   Eyes: Pupils are equal, round, and reactive to light.   Neck: Normal range of motion. Neck supple. No hepatojugular reflux and no JVD present. No tracheal deviation present.   Cardiovascular: Regular rhythm, normal heart sounds and intact distal pulses.  No extrasystoles are present. Tachycardia present.   No murmur heard.  Pulmonary/Chest: Accessory muscle usage present. No stridor. Tachypnea noted. She is in respiratory distress. She has decreased breath sounds in the right lower field and the left lower field. She has wheezes in the right upper field, the right lower field, the left upper field and the left lower field. She has no rhonchi. She has no rales.   Abdominal: Soft. Bowel sounds are normal. She exhibits no distension. There is no tenderness.   Musculoskeletal: Normal range of motion.        Right lower leg: Normal. She exhibits no edema.        Left lower leg: Normal. She exhibits no edema.   Neurological: She is alert and oriented to person, place, and time.   Skin: Skin is warm and dry. Capillary refill takes less than 2 seconds.   Psychiatric: Her behavior is normal. Her mood appears anxious.   Nursing note and vitals reviewed.      Procedures  none         ED Course      Labs Reviewed    COMPREHENSIVE METABOLIC PANEL - Abnormal; Notable for the following components:       Result Value    Glucose 193 (*)     CO2 19.0 (*)     eGFR Non African Amer 60 (*)     Anion Gap 17.0 (*)     All other components within normal limits    Narrative:     GFR Normal >60  Chronic Kidney Disease <60  Kidney Failure <15     CBC WITH AUTO DIFFERENTIAL - Abnormal; Notable for the following components:    WBC 14.25 (*)     Neutrophil % 39.9 (*)     Eosinophil % 11.5 (*)     Lymphocytes, Absolute 5.83 (*)     Eosinophils, Absolute 1.64 (*)     Immature Grans, Absolute 0.06 (*)     All other components within normal limits   BNP (IN-HOUSE) - Normal    Narrative:     Among patients with dyspnea, NT-proBNP is highly sensitive for the detection of acute congestive heart failure. In addition NT-proBNP of <300 pg/ml effectively rules out acute congestive heart failure with 99% negative predictive value.    Results may be falsely decreased if patient taking Biotin.     MAGNESIUM - Normal   RAINBOW DRAW    Narrative:     The following orders were created for panel order [place] Draw.  Procedure                               Abnormality         Status                     ---------                               -----------         ------                     Light Blue Top[[id number]]                                   Final result               Green Top (Gel)[[id number]]                                  Final result               Lavender Top[[id number]]                                     Final result               Gold Top - SST[[id number]]                                   Final result                 Please view results for these tests on the individual orders.   LIGHT BLUE TOP   GREEN TOP   LAVENDER TOP   GOLD TOP - SST   CBC AND DIFFERENTIAL    Narrative:     The following orders were created for panel order CBC & Differential.  Procedure                               Abnormality         Status                     ---------                                -----------         ------                     CBC Auto Differential[[id number]]        Abnormal            Final result                 Please view results for these tests on the individual orders.     Xr Chest 1 View    Result Date: 4/5/2020  Narrative: History:  asthma attack Procedure: XR CHEST 1 VIEW Comparison:  April 2, 2019 Findings:  Portable view of the chest was obtained at 4:16 AM. The heart size is normal and the lungs are clear. There is no definite pleural effusion or pneumothorax.  Aortic calcifications are noted.     Impression: Impression:  No acute abnormality as above. Electronically signed by:  Aries Mars MD  4/5/2020 4:40 AM CDT Workstation: [id number]    EKG April 5, 2020 at 0 347 reveals sinus tachycardia at a rate of 119 bpm.  Baseline artifact due to patient motion but no evidence of acute abnormality.            MDM  Number of Diagnoses or Management Options  Exacerbation of asthma, unspecified asthma severity, unspecified whether persistent:      Amount and/or Complexity of Data Reviewed  Clinical lab tests: reviewed  Tests in the radiology section of CPT®: reviewed  Tests in the medicine section of CPT®: reviewed    Patient Progress  Patient progress: stable    Chest x-ray clear.  Patient feels better after Solu-Medrol, breathing treatment.  Prescription prednisone given for the next few days.  Advised on continued with inhalers at home.  Laboratory studies unremarkable and agreeable to discharge.    Final diagnoses:   Exacerbation of asthma, unspecified asthma severity, unspecified whether persistent            Pawel Swain DO  04/05/20 0619

## 2020-04-05 NOTE — DISCHARGE INSTRUCTIONS
Please return for new or worsening symptoms.  Use inhalers as prescribed.  Get steroid prescription filled and take as directed for the next 5 days starting tomorrow.  You were given an IV dose of steroid today.  Follow-up with primary care.

## 2021-03-10 ENCOUNTER — IMMUNIZATION (OUTPATIENT)
Dept: VACCINE CLINIC | Facility: HOSPITAL | Age: 61
End: 2021-03-10

## 2021-03-10 PROCEDURE — 91300 HC SARSCOV02 VAC 30MCG/0.3ML IM: CPT | Performed by: THORACIC SURGERY (CARDIOTHORACIC VASCULAR SURGERY)

## 2021-03-10 PROCEDURE — 0001A: CPT | Performed by: THORACIC SURGERY (CARDIOTHORACIC VASCULAR SURGERY)

## 2021-03-31 ENCOUNTER — IMMUNIZATION (OUTPATIENT)
Dept: VACCINE CLINIC | Facility: HOSPITAL | Age: 61
End: 2021-03-31

## 2021-03-31 PROCEDURE — 91300 HC SARSCOV02 VAC 30MCG/0.3ML IM: CPT | Performed by: THORACIC SURGERY (CARDIOTHORACIC VASCULAR SURGERY)

## 2021-03-31 PROCEDURE — 0002A: CPT | Performed by: THORACIC SURGERY (CARDIOTHORACIC VASCULAR SURGERY)

## 2021-05-11 ENCOUNTER — LAB (OUTPATIENT)
Dept: LAB | Facility: HOSPITAL | Age: 61
End: 2021-05-11

## 2021-05-11 DIAGNOSIS — Z01.818 PREOP TESTING: Primary | ICD-10-CM

## 2021-05-11 PROCEDURE — C9803 HOPD COVID-19 SPEC COLLECT: HCPCS

## 2021-05-11 PROCEDURE — 87635 SARS-COV-2 COVID-19 AMP PRB: CPT

## 2021-05-12 RX ORDER — ALBUTEROL SULFATE 90 UG/1
2 AEROSOL, METERED RESPIRATORY (INHALATION) EVERY 4 HOURS PRN
COMMUNITY

## 2021-05-12 RX ORDER — MONTELUKAST SODIUM 10 MG/1
10 TABLET ORAL NIGHTLY
COMMUNITY
End: 2022-09-09 | Stop reason: SDUPTHER

## 2021-05-14 ENCOUNTER — ANESTHESIA (OUTPATIENT)
Dept: GASTROENTEROLOGY | Facility: HOSPITAL | Age: 61
End: 2021-05-14

## 2021-05-14 ENCOUNTER — HOSPITAL ENCOUNTER (OUTPATIENT)
Facility: HOSPITAL | Age: 61
Setting detail: HOSPITAL OUTPATIENT SURGERY
Discharge: HOME | End: 2021-05-14
Attending: INTERNAL MEDICINE | Admitting: INTERNAL MEDICINE

## 2021-05-14 ENCOUNTER — ANESTHESIA EVENT (OUTPATIENT)
Dept: GASTROENTEROLOGY | Facility: HOSPITAL | Age: 61
End: 2021-05-14

## 2021-05-14 VITALS
SYSTOLIC BLOOD PRESSURE: 137 MMHG | HEART RATE: 76 BPM | TEMPERATURE: 97.8 F | OXYGEN SATURATION: 99 % | RESPIRATION RATE: 18 BRPM | WEIGHT: 186.2 LBS | BODY MASS INDEX: 28.22 KG/M2 | HEIGHT: 68 IN | DIASTOLIC BLOOD PRESSURE: 79 MMHG

## 2021-05-14 DIAGNOSIS — Z12.11 ENCOUNTER FOR SCREENING COLONOSCOPY: ICD-10-CM

## 2021-05-14 PROCEDURE — 25010000002 PROPOFOL 10 MG/ML EMULSION: Performed by: NURSE ANESTHETIST, CERTIFIED REGISTERED

## 2021-05-14 RX ORDER — PROPOFOL 10 MG/ML
VIAL (ML) INTRAVENOUS AS NEEDED
Status: DISCONTINUED | OUTPATIENT
Start: 2021-05-14 | End: 2021-05-14 | Stop reason: SURG

## 2021-05-14 RX ORDER — LIDOCAINE HYDROCHLORIDE 20 MG/ML
INJECTION, SOLUTION INTRAVENOUS AS NEEDED
Status: DISCONTINUED | OUTPATIENT
Start: 2021-05-14 | End: 2021-05-14 | Stop reason: SURG

## 2021-05-14 RX ORDER — ONDANSETRON 2 MG/ML
4 INJECTION INTRAMUSCULAR; INTRAVENOUS ONCE AS NEEDED
Status: DISCONTINUED | OUTPATIENT
Start: 2021-05-14 | End: 2021-05-14 | Stop reason: HOSPADM

## 2021-05-14 RX ORDER — DEXTROSE AND SODIUM CHLORIDE 5; .45 G/100ML; G/100ML
30 INJECTION, SOLUTION INTRAVENOUS CONTINUOUS PRN
Status: DISCONTINUED | OUTPATIENT
Start: 2021-05-14 | End: 2021-05-14 | Stop reason: HOSPADM

## 2021-05-14 RX ADMIN — LIDOCAINE HYDROCHLORIDE 60 MG: 20 INJECTION, SOLUTION INTRAVENOUS at 15:03

## 2021-05-14 RX ADMIN — DEXTROSE AND SODIUM CHLORIDE 30 ML/HR: 5; 450 INJECTION, SOLUTION INTRAVENOUS at 14:29

## 2021-05-14 RX ADMIN — PROPOFOL 70 MG: 10 INJECTION, EMULSION INTRAVENOUS at 15:03

## 2021-05-14 NOTE — ANESTHESIA PREPROCEDURE EVALUATION
Anesthesia Evaluation     NPO Solid Status: > 8 hours  NPO Liquid Status: > 4 hours           Airway   Mallampati: III  TM distance: >3 FB  Neck ROM: full  Possible difficult intubation and Small opening  Dental    (+) upper dentures    Pulmonary - normal exam   (+) a smoker Current, asthma,  Cardiovascular   Exercise tolerance: good (4-7 METS)    NYHA Classification: III  Rhythm: regular  Rate: normal    (+) hypertension,       Neuro/Psych  (+) CVA,     GI/Hepatic/Renal/Endo - negative ROS     Musculoskeletal (-) negative ROS    Abdominal  - normal exam   Substance History - negative use     OB/GYN negative ob/gyn ROS         Other - negative ROS                       Anesthesia Plan    ASA 3     MAC     intravenous induction     Anesthetic plan, all risks, benefits, and alternatives have been provided, discussed and informed consent has been obtained with: patient.

## 2021-05-14 NOTE — ANESTHESIA POSTPROCEDURE EVALUATION
Patient: Yesenia Valentin    Procedure Summary     Date: 05/14/21 Room / Location: [facility] ENDOSCOPY 2 / [facility] ENDOSCOPY    Anesthesia Start: 1459 Anesthesia Stop: 1524    Procedure: COLONOSCOPY (N/A ) Diagnosis:       Encounter for screening colonoscopy      (Encounter for screening colonoscopy [Z12.11])    Surgeons: Landon Nunez DO Provider: Rex Rhodes CRNA    Anesthesia Type: MAC ASA Status: 3          Anesthesia Type: MAC    Vitals  No vitals data found for the desired time range.          Post Anesthesia Care and Evaluation    Patient location during evaluation: bedside  Level of consciousness: awake  Pain score: 0  Pain management: adequate  Airway patency: patent  Anesthetic complications: No anesthetic complications  PONV Status: none  Cardiovascular status: acceptable  Respiratory status: acceptable and spontaneous ventilation  Hydration status: acceptable

## 2021-05-19 LAB
LAB AP CASE REPORT: NORMAL
PATH REPORT.FINAL DX SPEC: NORMAL

## 2022-09-09 ENCOUNTER — OFFICE VISIT (OUTPATIENT)
Dept: FAMILY MEDICINE CLINIC | Facility: CLINIC | Age: 62
End: 2022-09-09

## 2022-09-09 VITALS
BODY MASS INDEX: 27.74 KG/M2 | HEART RATE: 94 BPM | RESPIRATION RATE: 24 BRPM | OXYGEN SATURATION: 97 % | HEIGHT: 68 IN | WEIGHT: 183 LBS | SYSTOLIC BLOOD PRESSURE: 120 MMHG | DIASTOLIC BLOOD PRESSURE: 78 MMHG

## 2022-09-09 DIAGNOSIS — Z12.31 BREAST CANCER SCREENING BY MAMMOGRAM: ICD-10-CM

## 2022-09-09 DIAGNOSIS — E78.5 HYPERLIPIDEMIA, UNSPECIFIED HYPERLIPIDEMIA TYPE: ICD-10-CM

## 2022-09-09 DIAGNOSIS — J43.8 OTHER EMPHYSEMA: ICD-10-CM

## 2022-09-09 DIAGNOSIS — I10 PRIMARY HYPERTENSION: ICD-10-CM

## 2022-09-09 DIAGNOSIS — K13.70 MOUTH LESION: ICD-10-CM

## 2022-09-09 DIAGNOSIS — Z76.89 ENCOUNTER TO ESTABLISH CARE: Primary | ICD-10-CM

## 2022-09-09 DIAGNOSIS — Z86.73 HX OF STROKE WITHOUT RESIDUAL DEFICITS: ICD-10-CM

## 2022-09-09 DIAGNOSIS — R11.0 NAUSEA: ICD-10-CM

## 2022-09-09 PROBLEM — F32.A DEPRESSION: Status: ACTIVE | Noted: 2022-09-09

## 2022-09-09 PROBLEM — K21.9 GERD (GASTROESOPHAGEAL REFLUX DISEASE): Status: ACTIVE | Noted: 2022-09-09

## 2022-09-09 PROBLEM — R29.90 STROKE-LIKE SYMPTOMS: Status: ACTIVE | Noted: 2019-04-02

## 2022-09-09 PROBLEM — I63.512 LEFT ACUTE ARTERIAL ISCHEMIC STROKE, MCA (MIDDLE CEREBRAL ARTERY) (HCC): Status: ACTIVE | Noted: 2019-05-13

## 2022-09-09 PROCEDURE — 99204 OFFICE O/P NEW MOD 45 MIN: CPT | Performed by: NURSE PRACTITIONER

## 2022-09-09 RX ORDER — ONDANSETRON HYDROCHLORIDE 8 MG/1
8 TABLET, FILM COATED ORAL EVERY 8 HOURS PRN
Qty: 30 TABLET | Refills: 1 | Status: SHIPPED | OUTPATIENT
Start: 2022-09-09

## 2022-09-09 RX ORDER — ASPIRIN 81 MG/1
81 TABLET ORAL DAILY
COMMUNITY

## 2022-09-09 RX ORDER — ATORVASTATIN CALCIUM 40 MG/1
40 TABLET, FILM COATED ORAL DAILY
Qty: 90 TABLET | Refills: 3 | Status: SHIPPED | OUTPATIENT
Start: 2022-09-09

## 2022-09-09 RX ORDER — AMLODIPINE BESYLATE 10 MG/1
10 TABLET ORAL DAILY
Qty: 90 TABLET | Refills: 3 | Status: SHIPPED | OUTPATIENT
Start: 2022-09-09

## 2022-09-09 RX ORDER — CLOPIDOGREL BISULFATE 75 MG/1
75 TABLET ORAL DAILY
Qty: 90 TABLET | Refills: 3 | Status: SHIPPED | OUTPATIENT
Start: 2022-09-09

## 2022-09-09 RX ORDER — MONTELUKAST SODIUM 10 MG/1
10 TABLET ORAL NIGHTLY
Qty: 90 TABLET | Refills: 3 | Status: SHIPPED | OUTPATIENT
Start: 2022-09-09

## 2022-09-09 RX ORDER — LISINOPRIL AND HYDROCHLOROTHIAZIDE 25; 20 MG/1; MG/1
1 TABLET ORAL DAILY
Qty: 90 TABLET | Refills: 3 | Status: SHIPPED | OUTPATIENT
Start: 2022-09-09

## 2022-09-09 NOTE — PROGRESS NOTES
Chief Complaint  Establish Care    Subjective          Yesenia Valentin presents to [facility] [place] to establish care.   Stroke in spring of 2019. Having shortness of air after exertion for about a year and a half. Has occasional cough.  Has concerns regarding a mouth lesion on her top lip that comes and goes.      Mouth Lesions   Episode onset: 5 months. Associated symptoms include mouth sores.   Hypertension  This is a chronic problem. The current episode started more than 1 year ago. The problem is controlled. Risk factors for coronary artery disease include smoking/tobacco exposure and dyslipidemia. Past treatments include calcium channel blockers and ACE inhibitors. Current antihypertension treatment includes ACE inhibitors and calcium channel blockers. The current treatment provides moderate improvement. Hypertensive end-organ damage includes CVA.            Outpatient Medications Prior to Visit   Medication Sig Dispense Refill   • albuterol sulfate  (90 Base) MCG/ACT inhaler Inhale 2 puffs Every 4 (Four) Hours As Needed for Wheezing.     • aspirin 81 MG EC tablet Take 81 mg by mouth Daily.     • Ergocalciferol 50 MCG (2000 UT) capsule Take  by mouth.     • amLODIPine (NORVASC) 10 MG tablet Take 10 mg by mouth Daily.     • atorvastatin (LIPITOR) 40 MG tablet Take 40 mg by mouth.     • clopidogrel (PLAVIX) 75 MG tablet Take 75 mg by mouth Daily.     • lisinopril (PRINIVIL,ZESTRIL) 40 MG tablet Take 40 mg by mouth Daily.     • montelukast (SINGULAIR) 10 MG tablet Take 10 mg by mouth Every Night.     • hydrALAZINE (APRESOLINE) 25 MG tablet Take 25 mg by mouth Daily As Needed.       No facility-administered medications prior to visit.       Review of Systems   HENT: Positive for mouth sores.          Objective   Vital Signs:   Visit Vitals  /78 (BP Location: Left arm, Patient Position: Sitting, Cuff Size: Adult)   Pulse 94   Resp 24   Ht 172.7 cm  "(68\")   Wt 83 kg (183 lb)   SpO2 97%   BMI 27.83 kg/m²     Physical Exam  Vitals and nursing note reviewed.   Constitutional:       Appearance: She is well-developed.   HENT:      Head: Normocephalic and atraumatic.        Mouth/Throat:      Mouth: Oral lesions present.     Eyes:      General: Lids are normal.      Conjunctiva/sclera: Conjunctivae normal.   Neck:      Thyroid: No thyroid mass or thyromegaly.      Trachea: Trachea normal. No tracheal tenderness.   Cardiovascular:      Rate and Rhythm: Normal rate.      Pulses: Normal pulses.      Heart sounds: Normal heart sounds.   Pulmonary:      Effort: Pulmonary effort is normal. No respiratory distress.      Breath sounds: Normal breath sounds. No wheezing.   Abdominal:      General: There is no distension.      Palpations: Abdomen is soft. There is no mass.   Musculoskeletal:         General: Normal range of motion.      Cervical back: Normal range of motion. No edema.   Lymphadenopathy:      Head:      Right side of head: No submental, submandibular or tonsillar adenopathy.      Left side of head: No submental, submandibular or tonsillar adenopathy.   Skin:     General: Skin is warm and dry.      Coloration: Skin is not pale.      Findings: No abrasion, erythema or lesion.   Neurological:      Mental Status: She is alert and oriented to person, place, and time.   Psychiatric:         Mood and Affect: Mood is not anxious. Affect is not inappropriate.         Speech: Speech normal.         Behavior: Behavior normal.         Thought Content: Thought content normal.         Judgment: Judgment normal. Judgment is not impulsive.        Result Review :                 Assessment and Plan        90 day supply   Diagnoses and all orders for this visit:    1. Encounter to establish care (Primary)  -     TSH; Future  -     Comprehensive Metabolic Panel; Future  -     CBC & Differential; Future  -     Vitamin B12; Future  -     Hemoglobin A1c; Future  -     Lipid Panel; " Future  -     Hepatitis C Antibody; Future    2. Primary hypertension  -     TSH; Future  -     Comprehensive Metabolic Panel; Future  -     CBC & Differential; Future  -     Vitamin B12; Future  -     Hemoglobin A1c; Future  -     Lipid Panel; Future  -     Hepatitis C Antibody; Future  -     amLODIPine (NORVASC) 10 MG tablet; Take 1 tablet by mouth Daily.  Dispense: 90 tablet; Refill: 3  -     lisinopril-hydrochlorothiazide (PRINZIDE,ZESTORETIC) 20-25 MG per tablet; Take 1 tablet by mouth Daily.  Dispense: 90 tablet; Refill: 3    3. Hx of stroke without residual deficits  -     TSH; Future  -     Comprehensive Metabolic Panel; Future  -     CBC & Differential; Future  -     Vitamin B12; Future  -     Hemoglobin A1c; Future  -     Lipid Panel; Future  -     Hepatitis C Antibody; Future  -     clopidogrel (PLAVIX) 75 MG tablet; Take 1 tablet by mouth Daily.  Dispense: 90 tablet; Refill: 3    4. Mouth lesion  -     Ambulatory Referral to Dermatology    5. Other emphysema (HCC)  -     TSH; Future  -     Comprehensive Metabolic Panel; Future  -     CBC & Differential; Future  -     Vitamin B12; Future  -     Hemoglobin A1c; Future  -     Lipid Panel; Future  -     Hepatitis C Antibody; Future  -     montelukast (SINGULAIR) 10 MG tablet; Take 1 tablet by mouth Every Night.  Dispense: 90 tablet; Refill: 3    6. Nausea  -     TSH; Future  -     Comprehensive Metabolic Panel; Future  -     CBC & Differential; Future  -     Vitamin B12; Future  -     Hemoglobin A1c; Future  -     Lipid Panel; Future  -     Hepatitis C Antibody; Future  -     ondansetron (Zofran) 8 MG tablet; Take 1 tablet by mouth Every 8 (Eight) Hours As Needed for Nausea.  Dispense: 30 tablet; Refill: 1    7. Hyperlipidemia, unspecified hyperlipidemia type  -     TSH; Future  -     Comprehensive Metabolic Panel; Future  -     CBC & Differential; Future  -     Vitamin B12; Future  -     Hemoglobin A1c; Future  -     Lipid Panel; Future  -     Hepatitis C  Antibody; Future  -     atorvastatin (LIPITOR) 40 MG tablet; Take 1 tablet by mouth Daily.  Dispense: 90 tablet; Refill: 3    8. Breast cancer screening by mammogram  -     Mammo screening digital tomosynthesis bilateral w CAD; Future    Complete ordered lab work  We will call with results  Continue current medications however we will change lisinopril to lisinopril HCTZ due to occasional swelling in legs    Referral placed to dermatology regarding lesion on upper lip, would like to rule out ca     Please call the office if you have any issues         Follow Up   Return in about 6 months (around 3/9/2023), or if symptoms worsen or fail to improve, for Next scheduled follow up.  Patient was given instructions and counseling regarding her condition or for health maintenance advice. Please see specific information pulled into the AVS if appropriate.           This document has been electronically signed by AWILDA Kitchen on September 12, 2022 16:09 CDT

## 2022-09-16 ENCOUNTER — PATIENT ROUNDING (BHMG ONLY) (OUTPATIENT)
Dept: FAMILY MEDICINE CLINIC | Facility: CLINIC | Age: 62
End: 2022-09-16

## 2022-09-16 NOTE — PROGRESS NOTES
September 16, 2022    Hello, may I speak with Yesenia Valentin?    My name is  Amirah Iverson    I am  with [facility] [address] [place] [address] PRIMARY CARE - [address] [address] [phone].    Before we get started may I verify your date of birth? 1960    I am calling to officially welcome you to our practice and ask about your recent visit. Is this a good time to talk? Yes    Tell me about your visit with us. What things went well?   Everything     We're always looking for ways to make our patients' experiences even better. Do you have recommendations on ways we may improve?  No    Overall were you satisfied with your first visit to our practice? Yes       I appreciate you taking the time to speak with me today. Is there anything else I can do for you? No      Thank you, and have a great day.

## 2022-10-14 ENCOUNTER — TELEPHONE (OUTPATIENT)
Dept: FAMILY MEDICINE CLINIC | Facility: CLINIC | Age: 62
End: 2022-10-14

## (undated) DEVICE — CANN SMPL SOFTECH BIFLO ETCO2 A/M 7FT

## (undated) DEVICE — SINGLE-USE BIOPSY FORCEPS: Brand: RADIAL JAW 4